# Patient Record
Sex: FEMALE | Race: BLACK OR AFRICAN AMERICAN | NOT HISPANIC OR LATINO | Employment: UNEMPLOYED | RURAL
[De-identification: names, ages, dates, MRNs, and addresses within clinical notes are randomized per-mention and may not be internally consistent; named-entity substitution may affect disease eponyms.]

---

## 2018-10-22 ENCOUNTER — HISTORICAL (OUTPATIENT)
Dept: ADMINISTRATIVE | Facility: HOSPITAL | Age: 28
End: 2018-10-22

## 2018-10-25 LAB
LAB AP CLINICAL INFORMATION: NORMAL
LAB AP GENERAL CAT - HISTORICAL: NORMAL
LAB AP INTERPRETATION/RESULT - HISTORICAL: NORMAL
LAB AP SPECIMEN ADEQUACY - HISTORICAL: NORMAL
LAB AP SPECIMEN SUBMITTED - HISTORICAL: NORMAL

## 2020-09-11 ENCOUNTER — HISTORICAL (OUTPATIENT)
Dept: ADMINISTRATIVE | Facility: HOSPITAL | Age: 30
End: 2020-09-11

## 2021-03-04 ENCOUNTER — HISTORICAL (OUTPATIENT)
Dept: ADMINISTRATIVE | Facility: HOSPITAL | Age: 31
End: 2021-03-04

## 2021-03-29 ENCOUNTER — TELEPHONE (OUTPATIENT)
Dept: OBSTETRICS AND GYNECOLOGY | Facility: CLINIC | Age: 31
End: 2021-03-29

## 2021-03-31 ENCOUNTER — TELEPHONE (OUTPATIENT)
Dept: OBSTETRICS AND GYNECOLOGY | Facility: CLINIC | Age: 31
End: 2021-03-31

## 2021-04-16 ENCOUNTER — TELEPHONE (OUTPATIENT)
Dept: OBSTETRICS AND GYNECOLOGY | Facility: CLINIC | Age: 31
End: 2021-04-16

## 2021-04-16 ENCOUNTER — HOSPITAL ENCOUNTER (OUTPATIENT)
Dept: RADIOLOGY | Facility: HOSPITAL | Age: 31
Discharge: HOME | End: 2021-04-16
Attending: MIDWIFE
Payer: MEDICAID

## 2021-04-16 DIAGNOSIS — R10.2 VAGINAL PAIN: ICD-10-CM

## 2021-04-16 PROCEDURE — 76856 US EXAM PELVIC COMPLETE: CPT | Mod: TC

## 2021-04-16 PROCEDURE — 76856 US EXAM PELVIC COMPLETE: CPT | Mod: 26,,,

## 2021-04-16 PROCEDURE — 76856 US PELVIS COMPLETE NON OB: ICD-10-PCS | Mod: 26,,,

## 2021-05-12 DIAGNOSIS — Z30.45 ENCOUNTER FOR SURVEILLANCE OF TRANSDERMAL CONTRACEPTIVE: Primary | ICD-10-CM

## 2021-05-12 RX ORDER — NORELGESTROMIN AND ETHINYL ESTRADIOL 150; 35 UG/D; UG/D
1 PATCH TRANSDERMAL WEEKLY
Qty: 3 PATCH | Refills: 10 | OUTPATIENT
Start: 2021-05-12

## 2021-05-12 RX ORDER — NORELGESTROMIN AND ETHINYL ESTRADIOL 150; 35 UG/D; UG/D
1 PATCH TRANSDERMAL WEEKLY
Qty: 4 PATCH | Refills: 3 | Status: SHIPPED | OUTPATIENT
Start: 2021-05-12 | End: 2021-05-13 | Stop reason: SDUPTHER

## 2021-05-12 RX ORDER — NORELGESTROMIN AND ETHINYL ESTRADIOL 150; 35 UG/D; UG/D
1 PATCH TRANSDERMAL WEEKLY
COMMUNITY
End: 2021-05-12 | Stop reason: ALTCHOICE

## 2021-05-13 DIAGNOSIS — Z30.45 ENCOUNTER FOR SURVEILLANCE OF TRANSDERMAL CONTRACEPTIVE: ICD-10-CM

## 2021-05-13 RX ORDER — NORELGESTROMIN AND ETHINYL ESTRADIOL 150; 35 UG/D; UG/D
1 PATCH TRANSDERMAL WEEKLY
Qty: 4 PATCH | Refills: 3 | Status: SHIPPED | OUTPATIENT
Start: 2021-05-13 | End: 2021-09-15

## 2021-05-17 ENCOUNTER — TELEPHONE (OUTPATIENT)
Dept: OBSTETRICS AND GYNECOLOGY | Facility: CLINIC | Age: 31
End: 2021-05-17

## 2021-11-03 ENCOUNTER — HOSPITAL ENCOUNTER (EMERGENCY)
Facility: HOSPITAL | Age: 31
Discharge: HOME | End: 2021-11-03
Payer: COMMERCIAL

## 2021-11-03 VITALS
HEIGHT: 64 IN | HEART RATE: 80 BPM | SYSTOLIC BLOOD PRESSURE: 109 MMHG | BODY MASS INDEX: 25.61 KG/M2 | OXYGEN SATURATION: 99 % | WEIGHT: 150 LBS | TEMPERATURE: 98 F | RESPIRATION RATE: 17 BRPM | DIASTOLIC BLOOD PRESSURE: 71 MMHG

## 2021-11-03 DIAGNOSIS — R07.9 CHEST PAIN: ICD-10-CM

## 2021-11-03 DIAGNOSIS — R07.89 ACUTE CHEST WALL PAIN: Primary | ICD-10-CM

## 2021-11-03 LAB
ALBUMIN SERPL BCP-MCNC: 3.6 G/DL (ref 3.5–5)
ALBUMIN/GLOB SERPL: 0.8 {RATIO}
ALP SERPL-CCNC: 85 U/L (ref 37–98)
ALT SERPL W P-5'-P-CCNC: 15 U/L (ref 13–56)
ANION GAP SERPL CALCULATED.3IONS-SCNC: 13 MMOL/L (ref 7–16)
AST SERPL W P-5'-P-CCNC: 16 U/L (ref 15–37)
BACTERIA #/AREA URNS HPF: ABNORMAL /HPF
BASOPHILS # BLD AUTO: 0.04 K/UL (ref 0–0.2)
BASOPHILS NFR BLD AUTO: 0.6 % (ref 0–1)
BILIRUB SERPL-MCNC: 0.2 MG/DL (ref 0–1.2)
BILIRUB UR QL STRIP: NEGATIVE
BUN SERPL-MCNC: 5 MG/DL (ref 7–18)
BUN/CREAT SERPL: 7 (ref 6–20)
CALCIUM SERPL-MCNC: 9.2 MG/DL (ref 8.5–10.1)
CHLORIDE SERPL-SCNC: 104 MMOL/L (ref 98–107)
CLARITY UR: CLEAR
CO2 SERPL-SCNC: 24 MMOL/L (ref 21–32)
COLOR UR: YELLOW
CREAT SERPL-MCNC: 0.67 MG/DL (ref 0.55–1.02)
DIFFERENTIAL METHOD BLD: ABNORMAL
EOSINOPHIL # BLD AUTO: 0.08 K/UL (ref 0–0.5)
EOSINOPHIL NFR BLD AUTO: 1.1 % (ref 1–4)
ERYTHROCYTE [DISTWIDTH] IN BLOOD BY AUTOMATED COUNT: 15 % (ref 11.5–14.5)
GLOBULIN SER-MCNC: 4.8 G/DL (ref 2–4)
GLUCOSE SERPL-MCNC: 97 MG/DL (ref 74–106)
GLUCOSE UR STRIP-MCNC: NEGATIVE MG/DL
HCG UR QL IA.RAPID: NEGATIVE
HCT VFR BLD AUTO: 34.6 % (ref 38–47)
HGB BLD-MCNC: 12.2 G/DL (ref 12–16)
KETONES UR STRIP-SCNC: NEGATIVE MG/DL
LEUKOCYTE ESTERASE UR QL STRIP: ABNORMAL
LYMPHOCYTES # BLD AUTO: 1.96 K/UL (ref 1–4.8)
LYMPHOCYTES NFR BLD AUTO: 27.9 % (ref 27–41)
MAGNESIUM SERPL-MCNC: 1.9 MG/DL (ref 1.7–2.3)
MCH RBC QN AUTO: 26 PG (ref 27–31)
MCHC RBC AUTO-ENTMCNC: 35.3 G/DL (ref 32–36)
MCV RBC AUTO: 73.6 FL (ref 80–96)
MONOCYTES # BLD AUTO: 0.79 K/UL (ref 0–0.8)
MONOCYTES NFR BLD AUTO: 11.3 % (ref 2–6)
MPC BLD CALC-MCNC: 12.6 FL (ref 9.4–12.4)
NEUTROPHILS # BLD AUTO: 4.15 K/UL (ref 1.8–7.7)
NEUTROPHILS NFR BLD AUTO: 59.1 % (ref 53–65)
NITRITE UR QL STRIP: NEGATIVE
PH UR STRIP: 8 PH UNITS
PLATELET # BLD AUTO: 267 K/UL (ref 150–400)
POTASSIUM SERPL-SCNC: 3.4 MMOL/L (ref 3.5–5.1)
PROT SERPL-MCNC: 8.4 G/DL (ref 6.4–8.2)
PROT UR QL STRIP: NEGATIVE
RBC # BLD AUTO: 4.7 M/UL (ref 4.2–5.4)
RBC # UR STRIP: NEGATIVE /UL
RBC #/AREA URNS HPF: ABNORMAL /HPF
SODIUM SERPL-SCNC: 138 MMOL/L (ref 136–145)
SP GR UR STRIP: 1.01
SQUAMOUS #/AREA URNS LPF: ABNORMAL /LPF
TROPONIN I SERPL HS-MCNC: 4.3 PG/ML
UROBILINOGEN UR STRIP-ACNC: 0.2 MG/DL
WBC # BLD AUTO: 7.02 K/UL (ref 4.5–11)
WBC #/AREA URNS HPF: ABNORMAL /HPF

## 2021-11-03 PROCEDURE — 36415 COLL VENOUS BLD VENIPUNCTURE: CPT | Performed by: NURSE PRACTITIONER

## 2021-11-03 PROCEDURE — 99283 PR EMERGENCY DEPT VISIT,LEVEL III: ICD-10-PCS | Mod: ,,, | Performed by: NURSE PRACTITIONER

## 2021-11-03 PROCEDURE — 81025 URINE PREGNANCY TEST: CPT | Performed by: NURSE PRACTITIONER

## 2021-11-03 PROCEDURE — 81001 URINALYSIS AUTO W/SCOPE: CPT | Performed by: NURSE PRACTITIONER

## 2021-11-03 PROCEDURE — 84484 ASSAY OF TROPONIN QUANT: CPT | Performed by: NURSE PRACTITIONER

## 2021-11-03 PROCEDURE — 81003 URINALYSIS AUTO W/O SCOPE: CPT | Performed by: NURSE PRACTITIONER

## 2021-11-03 PROCEDURE — 80053 COMPREHEN METABOLIC PANEL: CPT | Performed by: NURSE PRACTITIONER

## 2021-11-03 PROCEDURE — 93005 ELECTROCARDIOGRAM TRACING: CPT

## 2021-11-03 PROCEDURE — 93010 EKG 12-LEAD: ICD-10-PCS | Mod: ,,, | Performed by: INTERNAL MEDICINE

## 2021-11-03 PROCEDURE — 63600175 PHARM REV CODE 636 W HCPCS: Performed by: NURSE PRACTITIONER

## 2021-11-03 PROCEDURE — 93010 ELECTROCARDIOGRAM REPORT: CPT | Mod: ,,, | Performed by: INTERNAL MEDICINE

## 2021-11-03 PROCEDURE — 99283 EMERGENCY DEPT VISIT LOW MDM: CPT | Mod: ,,, | Performed by: NURSE PRACTITIONER

## 2021-11-03 PROCEDURE — 84075 ASSAY ALKALINE PHOSPHATASE: CPT | Performed by: NURSE PRACTITIONER

## 2021-11-03 PROCEDURE — 83735 ASSAY OF MAGNESIUM: CPT | Performed by: NURSE PRACTITIONER

## 2021-11-03 PROCEDURE — 99285 EMERGENCY DEPT VISIT HI MDM: CPT | Mod: 25

## 2021-11-03 PROCEDURE — 96372 THER/PROPH/DIAG INJ SC/IM: CPT

## 2021-11-03 PROCEDURE — 85025 COMPLETE CBC W/AUTO DIFF WBC: CPT | Performed by: NURSE PRACTITIONER

## 2021-11-03 RX ORDER — KETOROLAC TROMETHAMINE 30 MG/ML
30 INJECTION, SOLUTION INTRAMUSCULAR; INTRAVENOUS
Status: COMPLETED | OUTPATIENT
Start: 2021-11-03 | End: 2021-11-03

## 2021-11-03 RX ADMIN — KETOROLAC TROMETHAMINE 30 MG: 30 INJECTION, SOLUTION INTRAMUSCULAR at 04:11

## 2021-11-05 ENCOUNTER — TELEPHONE (OUTPATIENT)
Dept: EMERGENCY MEDICINE | Facility: HOSPITAL | Age: 31
End: 2021-11-05
Payer: MEDICAID

## 2021-11-10 ENCOUNTER — OFFICE VISIT (OUTPATIENT)
Dept: OBSTETRICS AND GYNECOLOGY | Facility: CLINIC | Age: 31
End: 2021-11-10
Payer: COMMERCIAL

## 2021-11-10 VITALS
WEIGHT: 144.19 LBS | HEART RATE: 97 BPM | DIASTOLIC BLOOD PRESSURE: 77 MMHG | SYSTOLIC BLOOD PRESSURE: 130 MMHG | BODY MASS INDEX: 24.62 KG/M2 | OXYGEN SATURATION: 99 % | RESPIRATION RATE: 16 BRPM | HEIGHT: 64 IN

## 2021-11-10 DIAGNOSIS — R00.0 TACHYCARDIA: ICD-10-CM

## 2021-11-10 DIAGNOSIS — Z72.51 HIGH RISK HETEROSEXUAL BEHAVIOR: Primary | ICD-10-CM

## 2021-11-10 DIAGNOSIS — N89.8 VAGINAL ODOR: ICD-10-CM

## 2021-11-10 LAB
CANDIDA SPECIES: NEGATIVE
GARDNERELLA: POSITIVE
T4 FREE SERPL-MCNC: 0.96 NG/DL (ref 0.76–1.46)
TRICHOMONAS: NEGATIVE
TSH SERPL DL<=0.005 MIU/L-ACNC: 0.4 UIU/ML (ref 0.36–3.74)

## 2021-11-10 PROCEDURE — 87510 BACTERIAL VAGINOSIS: ICD-10-PCS | Mod: ,,, | Performed by: CLINICAL MEDICAL LABORATORY

## 2021-11-10 PROCEDURE — 87591 N.GONORRHOEAE DNA AMP PROB: CPT | Mod: ,,, | Performed by: CLINICAL MEDICAL LABORATORY

## 2021-11-10 PROCEDURE — 84439 T4, FREE: ICD-10-PCS | Mod: ,,, | Performed by: CLINICAL MEDICAL LABORATORY

## 2021-11-10 PROCEDURE — 99213 OFFICE O/P EST LOW 20 MIN: CPT | Mod: ,,, | Performed by: ADVANCED PRACTICE MIDWIFE

## 2021-11-10 PROCEDURE — 99213 PR OFFICE/OUTPT VISIT, EST, LEVL III, 20-29 MIN: ICD-10-PCS | Mod: ,,, | Performed by: ADVANCED PRACTICE MIDWIFE

## 2021-11-10 PROCEDURE — 87480 BACTERIAL VAGINOSIS: ICD-10-PCS | Mod: ,,, | Performed by: CLINICAL MEDICAL LABORATORY

## 2021-11-10 PROCEDURE — 87591 CHLAMYDIA/GONORRHOEAE(GC), PCR: ICD-10-PCS | Mod: ,,, | Performed by: CLINICAL MEDICAL LABORATORY

## 2021-11-10 PROCEDURE — 87491 CHLAMYDIA/GONORRHOEAE(GC), PCR: ICD-10-PCS | Mod: ,,, | Performed by: CLINICAL MEDICAL LABORATORY

## 2021-11-10 PROCEDURE — 84443 ASSAY THYROID STIM HORMONE: CPT | Mod: ,,, | Performed by: CLINICAL MEDICAL LABORATORY

## 2021-11-10 PROCEDURE — 84439 ASSAY OF FREE THYROXINE: CPT | Mod: ,,, | Performed by: CLINICAL MEDICAL LABORATORY

## 2021-11-10 PROCEDURE — 87660 TRICHOMONAS VAGIN DIR PROBE: CPT | Mod: ,,, | Performed by: CLINICAL MEDICAL LABORATORY

## 2021-11-10 PROCEDURE — 84443 TSH: ICD-10-PCS | Mod: ,,, | Performed by: CLINICAL MEDICAL LABORATORY

## 2021-11-10 PROCEDURE — 87510 GARDNER VAG DNA DIR PROBE: CPT | Mod: ,,, | Performed by: CLINICAL MEDICAL LABORATORY

## 2021-11-10 PROCEDURE — 87480 CANDIDA DNA DIR PROBE: CPT | Mod: ,,, | Performed by: CLINICAL MEDICAL LABORATORY

## 2021-11-10 PROCEDURE — 87660 BACTERIAL VAGINOSIS: ICD-10-PCS | Mod: ,,, | Performed by: CLINICAL MEDICAL LABORATORY

## 2021-11-10 PROCEDURE — 87491 CHLMYD TRACH DNA AMP PROBE: CPT | Mod: ,,, | Performed by: CLINICAL MEDICAL LABORATORY

## 2021-11-10 RX ORDER — CHLORPHENIRAMINE MALEATE, DEXTROMETHORPHAN HYDROBROMIDE, AND PHENYLEPHRINE HYDROCHLORIDE 4; 10; 10 MG/1; MG/1; MG/1
TABLET, COATED ORAL
COMMUNITY
Start: 2021-11-09 | End: 2022-01-31 | Stop reason: ALTCHOICE

## 2021-11-10 RX ORDER — METHYLPREDNISOLONE 4 MG/1
TABLET ORAL
COMMUNITY
Start: 2021-11-09 | End: 2022-01-31 | Stop reason: ALTCHOICE

## 2021-11-11 DIAGNOSIS — N76.0 BACTERIAL VAGINOSIS: Primary | ICD-10-CM

## 2021-11-11 DIAGNOSIS — B96.89 BACTERIAL VAGINOSIS: Primary | ICD-10-CM

## 2021-11-11 LAB
CHLAMYDIA BY PCR: NEGATIVE
N. GONORRHOEAE (GC) BY PCR: NEGATIVE

## 2021-11-11 RX ORDER — METRONIDAZOLE 500 MG/1
500 TABLET ORAL 2 TIMES DAILY
Qty: 14 TABLET | Refills: 0 | Status: SHIPPED | OUTPATIENT
Start: 2021-11-11 | End: 2021-11-18

## 2021-11-12 ENCOUNTER — TELEPHONE (OUTPATIENT)
Dept: OBSTETRICS AND GYNECOLOGY | Facility: CLINIC | Age: 31
End: 2021-11-12
Payer: MEDICAID

## 2022-01-31 ENCOUNTER — OFFICE VISIT (OUTPATIENT)
Dept: OBSTETRICS AND GYNECOLOGY | Facility: CLINIC | Age: 32
End: 2022-01-31
Payer: COMMERCIAL

## 2022-01-31 VITALS
DIASTOLIC BLOOD PRESSURE: 82 MMHG | OXYGEN SATURATION: 99 % | WEIGHT: 147 LBS | HEIGHT: 64 IN | HEART RATE: 83 BPM | SYSTOLIC BLOOD PRESSURE: 124 MMHG | BODY MASS INDEX: 25.1 KG/M2 | RESPIRATION RATE: 17 BRPM | TEMPERATURE: 98 F

## 2022-01-31 DIAGNOSIS — N89.8 VAGINAL DISCHARGE: ICD-10-CM

## 2022-01-31 DIAGNOSIS — N92.0 MENORRHAGIA WITH REGULAR CYCLE: ICD-10-CM

## 2022-01-31 DIAGNOSIS — N92.1 BREAKTHROUGH BLEEDING ON CONTRACEPTIVE PATCH: ICD-10-CM

## 2022-01-31 DIAGNOSIS — Z30.45 ENCOUNTER FOR SURVEILLANCE OF TRANSDERMAL PATCH HORMONAL CONTRACEPTIVE DEVICE: Primary | ICD-10-CM

## 2022-01-31 DIAGNOSIS — R23.2 HOT FLASHES: ICD-10-CM

## 2022-01-31 LAB
CANDIDA SPECIES: NEGATIVE
FSH SERPL-ACNC: 0.4 MIU/ML (ref 1.7–134.8)
GARDNERELLA: POSITIVE
TRICHOMONAS: NEGATIVE

## 2022-01-31 PROCEDURE — 1159F PR MEDICATION LIST DOCUMENTED IN MEDICAL RECORD: ICD-10-PCS | Mod: CPTII,,, | Performed by: ADVANCED PRACTICE MIDWIFE

## 2022-01-31 PROCEDURE — 83001 ASSAY OF GONADOTROPIN (FSH): CPT | Mod: ,,, | Performed by: CLINICAL MEDICAL LABORATORY

## 2022-01-31 PROCEDURE — 3008F BODY MASS INDEX DOCD: CPT | Mod: CPTII,,, | Performed by: ADVANCED PRACTICE MIDWIFE

## 2022-01-31 PROCEDURE — 87480 CANDIDA DNA DIR PROBE: CPT | Mod: ,,, | Performed by: CLINICAL MEDICAL LABORATORY

## 2022-01-31 PROCEDURE — 3074F SYST BP LT 130 MM HG: CPT | Mod: CPTII,,, | Performed by: ADVANCED PRACTICE MIDWIFE

## 2022-01-31 PROCEDURE — 3079F PR MOST RECENT DIASTOLIC BLOOD PRESSURE 80-89 MM HG: ICD-10-PCS | Mod: CPTII,,, | Performed by: ADVANCED PRACTICE MIDWIFE

## 2022-01-31 PROCEDURE — 3074F PR MOST RECENT SYSTOLIC BLOOD PRESSURE < 130 MM HG: ICD-10-PCS | Mod: CPTII,,, | Performed by: ADVANCED PRACTICE MIDWIFE

## 2022-01-31 PROCEDURE — 87660 TRICHOMONAS VAGIN DIR PROBE: CPT | Mod: ,,, | Performed by: CLINICAL MEDICAL LABORATORY

## 2022-01-31 PROCEDURE — 1159F MED LIST DOCD IN RCRD: CPT | Mod: CPTII,,, | Performed by: ADVANCED PRACTICE MIDWIFE

## 2022-01-31 PROCEDURE — 99213 OFFICE O/P EST LOW 20 MIN: CPT | Mod: ,,, | Performed by: ADVANCED PRACTICE MIDWIFE

## 2022-01-31 PROCEDURE — 3079F DIAST BP 80-89 MM HG: CPT | Mod: CPTII,,, | Performed by: ADVANCED PRACTICE MIDWIFE

## 2022-01-31 PROCEDURE — 87660 BACTERIAL VAGINOSIS: ICD-10-PCS | Mod: ,,, | Performed by: CLINICAL MEDICAL LABORATORY

## 2022-01-31 PROCEDURE — 3008F PR BODY MASS INDEX (BMI) DOCUMENTED: ICD-10-PCS | Mod: CPTII,,, | Performed by: ADVANCED PRACTICE MIDWIFE

## 2022-01-31 PROCEDURE — 87510 GARDNER VAG DNA DIR PROBE: CPT | Mod: ,,, | Performed by: CLINICAL MEDICAL LABORATORY

## 2022-01-31 PROCEDURE — 99213 PR OFFICE/OUTPT VISIT, EST, LEVL III, 20-29 MIN: ICD-10-PCS | Mod: ,,, | Performed by: ADVANCED PRACTICE MIDWIFE

## 2022-01-31 PROCEDURE — 83001 FOLLICLE STIMULATING HORMONE: ICD-10-PCS | Mod: ,,, | Performed by: CLINICAL MEDICAL LABORATORY

## 2022-01-31 PROCEDURE — 87480 BACTERIAL VAGINOSIS: ICD-10-PCS | Mod: ,,, | Performed by: CLINICAL MEDICAL LABORATORY

## 2022-01-31 PROCEDURE — 87510 BACTERIAL VAGINOSIS: ICD-10-PCS | Mod: ,,, | Performed by: CLINICAL MEDICAL LABORATORY

## 2022-01-31 NOTE — LETTER
January 31, 2022      [facility] - Obstetrics and Gynecology  [address] [place] [id number]  Phone: [phone]  Fax: [phone]       Patient: Glenys Modi   YOB: 1990  Date of Visit: 01/31/2022    To Whom It May Concern:    Ozzy Modi  was at [facility] on 01/31/2022. The patient may return to work/school on 2/1/22 with no restrictions. If you have any questions or concerns, or if I can be of further assistance, please do not hesitate to contact me.    Sincerely,    Janice Barnard LPN

## 2022-01-31 NOTE — PROGRESS NOTES
Subjective:       Patient ID: Glenys Modi is a 31 y.o. female.    Chief Complaint: Vaginal Bleeding (X Jan. 10th -21st stopped x 2 or 3 days  and started again. No pain)    HPI  Glenys is using Xulane patches for her BC.  Last month she had a prolonged period from 1/10-1/21/22.  She had more BTB last week.  She is not bleeding today.   She is sexually active.   She is concerned about the bleeding.  I offered her the option of changing BC to Depo or ocps or placing her lst patch on day 4 of cycle instead of Day 7. She wants to try putting the first patch on Day 4.  She is on the 3rd patch now and should have another period next week.  Review of Systems   Constitutional: Negative.    HENT: Negative.    Respiratory: Negative.    Cardiovascular: Negative.    Gastrointestinal: Negative.    Genitourinary: Positive for menstrual problem.   Neurological: Negative.    Hematological: Negative.    Psychiatric/Behavioral: Negative.          Objective:      Physical Exam  Vitals and nursing note reviewed.   Constitutional:       Appearance: She is normal weight.   HENT:      Head: Normocephalic.      Nose: Nose normal.   Eyes:      Extraocular Movements: Extraocular movements intact.   Cardiovascular:      Rate and Rhythm: Normal rate.   Pulmonary:      Effort: Pulmonary effort is normal.   Abdominal:      General: Abdomen is flat. There is no distension.      Palpations: Abdomen is soft. There is no mass.      Tenderness: There is no abdominal tenderness.   Skin:     General: Skin is warm and dry.   Neurological:      Mental Status: She is alert and oriented to person, place, and time.   Psychiatric:         Mood and Affect: Mood normal.         Behavior: Behavior normal.         Assessment:       Problem List Items Addressed This Visit    None     Visit Diagnoses     Encounter for surveillance of transdermal patch hormonal contraceptive device    -  Primary    Menorrhagia with regular cycle        Relevant Orders    CBC Auto  Differential    Hot flashes        Relevant Orders    Follicle Stimulating Hormone    Vaginal discharge        Relevant Orders    Bacterial Vaginosis    Breakthrough bleeding on contraceptive patch              Plan:     Start first patch of cycle on Day 4 of bleeding    CBC    F/u 2 month and prn.

## 2022-02-01 ENCOUNTER — TELEPHONE (OUTPATIENT)
Dept: OBSTETRICS AND GYNECOLOGY | Facility: CLINIC | Age: 32
End: 2022-02-01
Payer: MEDICAID

## 2022-02-01 DIAGNOSIS — N76.0 BACTERIAL VAGINOSIS: Primary | ICD-10-CM

## 2022-02-01 DIAGNOSIS — B96.89 BACTERIAL VAGINOSIS: Primary | ICD-10-CM

## 2022-02-01 RX ORDER — METRONIDAZOLE 500 MG/1
500 TABLET ORAL 2 TIMES DAILY
Qty: 14 TABLET | Refills: 0 | Status: SHIPPED | OUTPATIENT
Start: 2022-02-01 | End: 2022-02-08

## 2022-02-01 NOTE — TELEPHONE ENCOUNTER
----- Message from Liya Benitez CNM sent at 2/1/2022  8:27 AM CST -----  Review with pt.as BV and I sent Flagyl.  Her hormone level was normal, so I don't know why she is having the hot flashes.

## 2022-02-07 ENCOUNTER — TELEPHONE (OUTPATIENT)
Dept: OBSTETRICS AND GYNECOLOGY | Facility: CLINIC | Age: 32
End: 2022-02-07
Payer: MEDICAID

## 2022-09-09 DIAGNOSIS — O44.40 LOW-LYING PLACENTA: Primary | ICD-10-CM

## 2022-09-09 DIAGNOSIS — Z34.90 PREGNANCY, UNSPECIFIED GESTATIONAL AGE: ICD-10-CM

## 2022-09-21 ENCOUNTER — HOSPITAL ENCOUNTER (OUTPATIENT)
Dept: RADIOLOGY | Facility: HOSPITAL | Age: 32
Discharge: HOME | End: 2022-09-21
Attending: ADVANCED PRACTICE MIDWIFE
Payer: MEDICAID

## 2022-09-21 ENCOUNTER — ROUTINE PRENATAL (OUTPATIENT)
Dept: OBSTETRICS AND GYNECOLOGY | Facility: CLINIC | Age: 32
End: 2022-09-21
Payer: MEDICAID

## 2022-09-21 VITALS — DIASTOLIC BLOOD PRESSURE: 64 MMHG | BODY MASS INDEX: 25.85 KG/M2 | WEIGHT: 150.63 LBS | SYSTOLIC BLOOD PRESSURE: 92 MMHG

## 2022-09-21 DIAGNOSIS — Z34.83 ENCOUNTER FOR SUPERVISION OF OTHER NORMAL PREGNANCY IN THIRD TRIMESTER: Primary | ICD-10-CM

## 2022-09-21 DIAGNOSIS — Z3A.27 27 WEEKS GESTATION OF PREGNANCY: ICD-10-CM

## 2022-09-21 DIAGNOSIS — O44.40 LOW-LYING PLACENTA: ICD-10-CM

## 2022-09-21 LAB
BILIRUB SERPL-MCNC: NORMAL MG/DL
BLOOD URINE, POC: NORMAL
COLOR, POC UA: NORMAL
GLUCOSE UR QL STRIP: 100
KETONES UR QL STRIP: NORMAL
LEUKOCYTE ESTERASE URINE, POC: NORMAL
NITRITE, POC UA: NORMAL
PH, POC UA: 6.5
PROTEIN, POC: NORMAL
SPECIFIC GRAVITY, POC UA: 1
UROBILINOGEN, POC UA: NORMAL

## 2022-09-21 PROCEDURE — 76816 OB US FOLLOW-UP PER FETUS: CPT | Mod: 26,,, | Performed by: RADIOLOGY

## 2022-09-21 PROCEDURE — 81003 URINALYSIS AUTO W/O SCOPE: CPT | Mod: PBBFAC | Performed by: ADVANCED PRACTICE MIDWIFE

## 2022-09-21 PROCEDURE — 76816 OB US FOLLOW-UP PER FETUS: CPT | Mod: TC

## 2022-09-21 PROCEDURE — 99213 PR OFFICE/OUTPT VISIT, EST, LEVL III, 20-29 MIN: ICD-10-PCS | Mod: S$PBB,TH,, | Performed by: ADVANCED PRACTICE MIDWIFE

## 2022-09-21 PROCEDURE — 99213 OFFICE O/P EST LOW 20 MIN: CPT | Mod: S$PBB,TH,, | Performed by: ADVANCED PRACTICE MIDWIFE

## 2022-09-21 PROCEDURE — 99213 OFFICE O/P EST LOW 20 MIN: CPT | Mod: PBBFAC | Performed by: ADVANCED PRACTICE MIDWIFE

## 2022-09-21 PROCEDURE — 76816 US OB FOLLOW UP EA GESTATION TRANSABDOMINAL: ICD-10-PCS | Mod: 26,,, | Performed by: RADIOLOGY

## 2022-09-21 RX ORDER — ONDANSETRON 8 MG/1
TABLET, ORALLY DISINTEGRATING ORAL
Status: ON HOLD | COMMUNITY
Start: 2022-06-29 | End: 2022-12-02 | Stop reason: HOSPADM

## 2022-09-21 RX ORDER — PYRIDOXINE HCL (VITAMIN B6) 100 MG
1 TABLET ORAL
Status: ON HOLD | COMMUNITY
End: 2022-12-02 | Stop reason: HOSPADM

## 2022-09-21 RX ORDER — OMEPRAZOLE 20 MG/1
40 TABLET, DELAYED RELEASE ORAL DAILY
Qty: 28 TABLET | Refills: 5 | Status: SHIPPED | OUTPATIENT
Start: 2022-09-21 | End: 2023-02-05

## 2022-09-21 RX ORDER — FAMOTIDINE 20 MG/1
1 TABLET, FILM COATED ORAL
COMMUNITY
Start: 2022-01-10 | End: 2022-09-21 | Stop reason: ALTCHOICE

## 2022-09-21 RX ORDER — NIFEDIPINE 10 MG/1
CAPSULE ORAL
Status: ON HOLD | COMMUNITY
Start: 2022-08-18 | End: 2022-12-02 | Stop reason: HOSPADM

## 2022-09-21 NOTE — PROGRESS NOTES
Pepcid not helping indigestion.  Rx Prilosec escripted.  1hr GTT done today.   labor precautions reviewed.  Discussed danger s/s to report including decreased fetal movements.

## 2022-09-23 ENCOUNTER — HOSPITAL ENCOUNTER (OUTPATIENT)
Facility: HOSPITAL | Age: 32
Discharge: HOME | End: 2022-09-23
Attending: OBSTETRICS & GYNECOLOGY | Admitting: OBSTETRICS & GYNECOLOGY
Payer: MEDICAID

## 2022-09-23 VITALS — HEART RATE: 84 BPM | DIASTOLIC BLOOD PRESSURE: 76 MMHG | SYSTOLIC BLOOD PRESSURE: 121 MMHG

## 2022-09-23 DIAGNOSIS — O46.90 VAGINAL BLEEDING IN PREGNANCY: ICD-10-CM

## 2022-09-23 LAB
BILIRUB UR QL STRIP: NEGATIVE
CLARITY UR: CLEAR
COLOR UR: COLORLESS
GLUCOSE UR STRIP-MCNC: NORMAL MG/DL
KETONES UR STRIP-SCNC: NEGATIVE MG/DL
LEUKOCYTE ESTERASE UR QL STRIP: NEGATIVE
NITRITE UR QL STRIP: NEGATIVE
PH UR STRIP: 6 PH UNITS
PROT UR QL STRIP: NEGATIVE
RBC # UR STRIP: NEGATIVE /UL
SP GR UR STRIP: 1.01
UROBILINOGEN UR STRIP-ACNC: NORMAL MG/DL

## 2022-09-23 PROCEDURE — 99211 OFF/OP EST MAY X REQ PHY/QHP: CPT

## 2022-09-23 PROCEDURE — 81003 URINALYSIS AUTO W/O SCOPE: CPT | Performed by: OBSTETRICS & GYNECOLOGY

## 2022-09-23 RX ORDER — ACETAMINOPHEN 500 MG
500 TABLET ORAL EVERY 6 HOURS PRN
Status: DISCONTINUED | OUTPATIENT
Start: 2022-09-23 | End: 2022-09-24 | Stop reason: HOSPADM

## 2022-09-23 RX ORDER — PROCHLORPERAZINE EDISYLATE 5 MG/ML
5 INJECTION INTRAMUSCULAR; INTRAVENOUS EVERY 6 HOURS PRN
Status: DISCONTINUED | OUTPATIENT
Start: 2022-09-23 | End: 2022-09-24 | Stop reason: HOSPADM

## 2022-09-23 RX ORDER — ONDANSETRON 4 MG/1
8 TABLET, ORALLY DISINTEGRATING ORAL EVERY 8 HOURS PRN
Status: DISCONTINUED | OUTPATIENT
Start: 2022-09-23 | End: 2022-09-24 | Stop reason: HOSPADM

## 2022-09-24 NOTE — H&P
Chief Complain - pelvic pain; hemorrhoidal pain    HPI:  [age] year old G 4 P 3003 at 28 and 1/7 WGA presents with complaint of pelvic and hemorrhoidal pain.    ROS:  + fetal movement; no leakage of fluid; no vaginal bleeding; no contractions  All other review of systems negative      Past medical history-none    Past surgical history-none    Past OB history- x3 all at term    Past gyn history-no recent STDs or abnormal Paps    Medications-prenatal vitamins    Social history-no alcohol, tobacco, or drugs    Allergies-no known drug allergies    Family history-mother with type 2 diabetes        Vital signs are stable and the patient is afebrile  Fetal heart tones -130s and reactive/category 1; tocometer - no contractions        Physical exam:    General-alert and oriented x3 no acute distress  Chest-clear to auscultation bilaterally  Heart-regular rate and rhythm  HEENT-PERRL; EOMI  Abdomen-soft, nontender, gravid uterus with no fundal tenderness  -no vulvar vaginal lesions; cervix-long/thick/closed by SSE; no vaginal bleeding or leaking of fluid  Extremities-no lower extremity edema  Rectal-mildly inflamed external hemorrhoids present    Laboratories:    Urinalysis negative    Radiology:    None    Assessment and plan)  1-intrauterine pregnancy 28 and 1/7 weeks gestational age  2-pelvic pain with no signs or symptoms of  labor  3-inflamed hemorrhoids-Rx for Proctofoam NS given; patient also given General Care instructions for further hemorrhoidal care  4-will discharge home; patient follow-up with OB as scheduled return to a observation for worsening symptoms or any other problems

## 2022-10-06 ENCOUNTER — ROUTINE PRENATAL (OUTPATIENT)
Dept: OBSTETRICS AND GYNECOLOGY | Facility: CLINIC | Age: 32
End: 2022-10-06
Payer: MEDICAID

## 2022-10-06 VITALS — BODY MASS INDEX: 25.75 KG/M2 | WEIGHT: 150 LBS | DIASTOLIC BLOOD PRESSURE: 62 MMHG | SYSTOLIC BLOOD PRESSURE: 90 MMHG

## 2022-10-06 DIAGNOSIS — Z34.83 ENCOUNTER FOR SUPERVISION OF OTHER NORMAL PREGNANCY IN THIRD TRIMESTER: Primary | ICD-10-CM

## 2022-10-06 DIAGNOSIS — K11.7 PTYALISM: ICD-10-CM

## 2022-10-06 DIAGNOSIS — R35.0 URINARY FREQUENCY: ICD-10-CM

## 2022-10-06 DIAGNOSIS — Z3A.30 30 WEEKS GESTATION OF PREGNANCY: ICD-10-CM

## 2022-10-06 LAB
-2 PROPSA (PHI): NORMAL
BILIRUB SERPL-MCNC: NORMAL MG/DL
BLOOD URINE, POC: NORMAL
COLOR, POC UA: COLORLESS
GLUCOSE UR QL STRIP: NORMAL
KETONES UR QL STRIP: NORMAL
LEUKOCYTE ESTERASE URINE, POC: NORMAL
NITRITE, POC UA: NORMAL
PH, POC UA: 5
PROTEIN, POC: NORMAL
SPECIFIC GRAVITY, POC UA: 1
UROBILINOGEN, POC UA: NORMAL

## 2022-10-06 PROCEDURE — 99213 PR OFFICE/OUTPT VISIT, EST, LEVL III, 20-29 MIN: ICD-10-PCS | Mod: S$PBB,TH,, | Performed by: ADVANCED PRACTICE MIDWIFE

## 2022-10-06 PROCEDURE — 99213 OFFICE O/P EST LOW 20 MIN: CPT | Mod: PBBFAC | Performed by: ADVANCED PRACTICE MIDWIFE

## 2022-10-06 PROCEDURE — 99213 OFFICE O/P EST LOW 20 MIN: CPT | Mod: S$PBB,TH,, | Performed by: ADVANCED PRACTICE MIDWIFE

## 2022-10-06 PROCEDURE — 81003 URINALYSIS AUTO W/O SCOPE: CPT | Mod: PBBFAC | Performed by: ADVANCED PRACTICE MIDWIFE

## 2022-10-06 NOTE — PROGRESS NOTES
C/O hemorrhoids.  Using tucks pad and hemorrhoid cream.  Recommend stool softener daily through the remainder of the pregnancy.  labor precautions reviewed.  Discussed danger s/s to report including decreased fetal movements.

## 2022-10-20 ENCOUNTER — ROUTINE PRENATAL (OUTPATIENT)
Dept: OBSTETRICS AND GYNECOLOGY | Facility: CLINIC | Age: 32
End: 2022-10-20
Payer: MEDICAID

## 2022-10-20 ENCOUNTER — PATIENT MESSAGE (OUTPATIENT)
Dept: OBSTETRICS AND GYNECOLOGY | Facility: CLINIC | Age: 32
End: 2022-10-20
Payer: MEDICAID

## 2022-10-20 VITALS — SYSTOLIC BLOOD PRESSURE: 90 MMHG | BODY MASS INDEX: 26.61 KG/M2 | DIASTOLIC BLOOD PRESSURE: 62 MMHG | WEIGHT: 155 LBS

## 2022-10-20 DIAGNOSIS — R35.0 URINARY FREQUENCY: ICD-10-CM

## 2022-10-20 DIAGNOSIS — Z3A.32 32 WEEKS GESTATION OF PREGNANCY: ICD-10-CM

## 2022-10-20 DIAGNOSIS — K11.7 PTYALISM: ICD-10-CM

## 2022-10-20 DIAGNOSIS — Z34.83 ENCOUNTER FOR SUPERVISION OF OTHER NORMAL PREGNANCY IN THIRD TRIMESTER: Primary | ICD-10-CM

## 2022-10-20 LAB
BILIRUB SERPL-MCNC: NORMAL MG/DL
BLOOD URINE, POC: NORMAL
COLOR, POC UA: YELLOW
GLUCOSE UR QL STRIP: NORMAL
KETONES UR QL STRIP: NORMAL
LEUKOCYTE ESTERASE URINE, POC: NORMAL
NITRITE, POC UA: NORMAL
PH, POC UA: 5
PROTEIN, POC: NORMAL
SPECIFIC GRAVITY, POC UA: 1.01
UROBILINOGEN, POC UA: NORMAL

## 2022-10-20 PROCEDURE — 99213 OFFICE O/P EST LOW 20 MIN: CPT | Mod: PBBFAC | Performed by: ADVANCED PRACTICE MIDWIFE

## 2022-10-20 PROCEDURE — 99213 PR OFFICE/OUTPT VISIT, EST, LEVL III, 20-29 MIN: ICD-10-PCS | Mod: S$PBB,TH,, | Performed by: ADVANCED PRACTICE MIDWIFE

## 2022-10-20 PROCEDURE — 81003 URINALYSIS AUTO W/O SCOPE: CPT | Mod: PBBFAC | Performed by: ADVANCED PRACTICE MIDWIFE

## 2022-10-20 PROCEDURE — 99213 OFFICE O/P EST LOW 20 MIN: CPT | Mod: S$PBB,TH,, | Performed by: ADVANCED PRACTICE MIDWIFE

## 2022-10-20 NOTE — PROGRESS NOTES
Denies sig complaints today.   labor precautions reviewed.  Discussed danger s/s to report including decreased fetal movements. Medicaid tubal consent signed.  Discussed R/B/A and pt wishes to proceed.

## 2022-11-01 ENCOUNTER — TELEPHONE (OUTPATIENT)
Dept: OBSTETRICS AND GYNECOLOGY | Facility: CLINIC | Age: 32
End: 2022-11-01
Payer: MEDICAID

## 2022-11-01 RX ORDER — OSELTAMIVIR PHOSPHATE 75 MG/1
75 CAPSULE ORAL 2 TIMES DAILY
Qty: 10 CAPSULE | Refills: 0 | Status: SHIPPED | OUTPATIENT
Start: 2022-11-01 | End: 2022-11-06

## 2022-11-01 NOTE — TELEPHONE ENCOUNTER
----- Message from Pramod Loja sent at 10/31/2022  4:12 PM CDT -----  NEEDS A CALL BACK  [phone]

## 2022-11-01 NOTE — TELEPHONE ENCOUNTER
Pt's son tested positive for flu but she was negative. She was told she would need to call [facility] to get a Rx for tamiflu. She's having some throat irriation, ear ache, headache. Let pt know that I would get w/Mana & see how she wants to proceed. Advised pt to stay hydrated & take tylenol for fever as needed. Pt also stated that she has an appt on Thursday.

## 2022-11-03 ENCOUNTER — ROUTINE PRENATAL (OUTPATIENT)
Dept: OBSTETRICS AND GYNECOLOGY | Facility: CLINIC | Age: 32
End: 2022-11-03
Payer: MEDICAID

## 2022-11-03 VITALS — BODY MASS INDEX: 26.61 KG/M2 | DIASTOLIC BLOOD PRESSURE: 62 MMHG | SYSTOLIC BLOOD PRESSURE: 90 MMHG | WEIGHT: 155 LBS

## 2022-11-03 DIAGNOSIS — Z3A.34 34 WEEKS GESTATION OF PREGNANCY: Primary | ICD-10-CM

## 2022-11-03 PROCEDURE — 59425 ANTEPARTUM CARE ONLY: CPT | Mod: S$PBB,TH,, | Performed by: ADVANCED PRACTICE MIDWIFE

## 2022-11-03 PROCEDURE — 59425 PR ANTEPARTUM CARE ONLY, 4-6 VISITS: ICD-10-PCS | Mod: S$PBB,TH,, | Performed by: ADVANCED PRACTICE MIDWIFE

## 2022-11-03 PROCEDURE — 99213 OFFICE O/P EST LOW 20 MIN: CPT | Mod: PBBFAC | Performed by: ADVANCED PRACTICE MIDWIFE

## 2022-11-03 NOTE — PROGRESS NOTES
C/O multiple contractions throughout the day.  Some hours they are consistent.  C/O lower back pain and pelvic pain.  Has tried heating pad on back and this doesn't help.  Has also tried Tylenol.  Cx 1cm, thick soft posterior today.  Abd palpates soft, non-tender. Pt has not taken her procardia this morning.  Instr to take every 6 hours.  Instr to increase fluids.   labor precautions reviewed.  Discussed danger s/s to report including decreased fetal movements.

## 2022-11-10 ENCOUNTER — PATIENT MESSAGE (OUTPATIENT)
Dept: OBSTETRICS AND GYNECOLOGY | Facility: CLINIC | Age: 32
End: 2022-11-10
Payer: MEDICAID

## 2022-11-11 ENCOUNTER — HOSPITAL ENCOUNTER (OUTPATIENT)
Facility: HOSPITAL | Age: 32
Discharge: HOME | End: 2022-11-11
Attending: OBSTETRICS & GYNECOLOGY | Admitting: OBSTETRICS & GYNECOLOGY
Payer: MEDICAID

## 2022-11-11 VITALS
DIASTOLIC BLOOD PRESSURE: 71 MMHG | SYSTOLIC BLOOD PRESSURE: 129 MMHG | TEMPERATURE: 98 F | RESPIRATION RATE: 18 BRPM | OXYGEN SATURATION: 99 % | HEART RATE: 110 BPM

## 2022-11-11 LAB
BILIRUB UR QL STRIP: NEGATIVE
CLARITY UR: CLEAR
COLOR UR: NORMAL
GLUCOSE UR STRIP-MCNC: NORMAL MG/DL
KETONES UR STRIP-SCNC: NEGATIVE MG/DL
LEUKOCYTE ESTERASE UR QL STRIP: NEGATIVE
NITRITE UR QL STRIP: NEGATIVE
PH UR STRIP: 6 PH UNITS
PROT UR QL STRIP: NEGATIVE
RBC # UR STRIP: NEGATIVE /UL
SP GR UR STRIP: 1.01
UROBILINOGEN UR STRIP-ACNC: NORMAL MG/DL

## 2022-11-11 PROCEDURE — 81003 URINALYSIS AUTO W/O SCOPE: CPT | Performed by: OBSTETRICS & GYNECOLOGY

## 2022-11-11 PROCEDURE — 99211 OFF/OP EST MAY X REQ PHY/QHP: CPT

## 2022-11-11 NOTE — H&P
[facility] -  Labor and Delivery  History & Physical  Obstetrics   Labor and Delivery Triage    SUBJECTIVE:     Patient Info:  Glenys Modi         32 y.o.    female    1990     Chief Complaint/Reason for Admission:  Sharp pains    History of Present Illness:  Patient is a 32-year-old  003 at 35 weeks and 1 day with complaint of sharp lower abdominal pain along with vaginal pain.  Feels like contractions.  This is been present for some time but more uncomfortable this morning.  Endorses good fetal movement.  No loss of fluid or bleeding.  No prior history of pre term labor.  No dysuria or constipation.    OB History:   OB History          4    Para   3    Term   3       0    AB   0    Living   3         SAB        IAB        Ectopic        Multiple        Live Births   3                   Estimated Date of Delivery: 12/15/22       Past Medical History:  Past Medical History:   Diagnosis Date    Anemia, unspecified     Hyperemesis     Sickle cell trait         Past Surgical History:  No past surgical history on file.    Social History:   Alcohol/Tobacco:  Social History     Tobacco Use    Smoking status: Never     Passive exposure: Current    Smokeless tobacco: Never   Substance Use Topics    Alcohol use: Never      Drug Use:  Social History     Substance and Sexual Activity   Drug Use Never       Family History:  Family History   Problem Relation Age of Onset    Miscarriages / Stillbirths Mother     Diabetes Mother        Allergies:  Review of patient's allergies indicates:  No Known Allergies    Meds Prior to Arrival:  Prior to Admission medications    Medication Sig Start Date End Date Taking? Authorizing Provider   NIFEdipine (PROCARDIA) 10 MG Cap TAKE ONE CAPSULE BY MOUTH EVERY 6 HOURS 22   Historical Provider   omeprazole (PRILOSEC OTC) 20 MG tablet Take 2 tablets (40 mg total) by mouth once daily. 22  Mana Andrea CNM   ondansetron (ZOFRAN-ODT) 8 MG AngeloDL  PLACE ONE TABLET ON THE top of TONGUE AND let DISSOLVE THEN swallow EVERY 6 HOURS AS NEEDED FOR NAUSEA 6/29/22   Historical Provider   prenat.vits,carlee,min-iron-folic (PRENATAL VITAMIN) Tab     Historical Provider   pyridoxine, vitamin B6, (B-6) 100 MG Tab 1 tablet.    Historical Provider   XULANE 150-35 mcg/24 hr PLACE ONE PATCH onto SKIN ONCE a week  Patient not taking: Reported on 9/21/2022 1/7/22   Liya Benitez CNM        Review of Systems:  Negative except as noted in HPI    OBJECTIVE:     Recent Vitals:  LMP 03/10/2022 (Approximate)     Exam:  General adult female no distress  HEENT NC/AT  Pulmonary normal effort  CV regular  Abdomen soft gravid nontender  Extremities benign  Psych normal affect  Skin warm and dry  Pelvic   Cx 1/40      T  CAT 1  [place] quiet    Lab Results:  Recent Results (from the past 36 hour(s))   Urinalysis, Reflex to Urine Culture    Collection Time: 11/11/22 11:33 AM    Specimen: Urine, Clean Catch   Result Value Ref Range    Color, UA Light Yellow Colorless, Straw, Yellow, Light Yellow, Dark Yellow    Clarity, UA Clear Clear    pH, UA 6.0 5.0 to 8.0 pH Units    Leukocytes, UA Negative Negative    Nitrites, UA Negative Negative    Protein, UA Negative Negative    Glucose, UA Normal Normal mg/dL    Ketones, UA Negative Negative mg/dL    Urobilinogen, UA Normal 0.2, 1.0, Normal mg/dL    Bilirubin, UA Negative Negative    Blood, UA Negative Negative    Specific Gravity, UA 1.014 <=1.030     [facility]: [facility]       Diagnostic Studies:              ASSESSMENT/PLAN:     35w1d   Not in labor   Home, reviewed precautions

## 2022-11-17 ENCOUNTER — ROUTINE PRENATAL (OUTPATIENT)
Dept: OBSTETRICS AND GYNECOLOGY | Facility: CLINIC | Age: 32
End: 2022-11-17
Payer: MEDICAID

## 2022-11-17 VITALS — BODY MASS INDEX: 27.46 KG/M2 | DIASTOLIC BLOOD PRESSURE: 70 MMHG | WEIGHT: 160 LBS | SYSTOLIC BLOOD PRESSURE: 110 MMHG

## 2022-11-17 DIAGNOSIS — Z3A.36 36 WEEKS GESTATION OF PREGNANCY: ICD-10-CM

## 2022-11-17 DIAGNOSIS — Z03.79 ENCOUNTER FOR OTHER SUSPECTED MATERNAL AND FETAL CONDITIONS RULED OUT: ICD-10-CM

## 2022-11-17 DIAGNOSIS — R35.0 URINARY FREQUENCY: ICD-10-CM

## 2022-11-17 DIAGNOSIS — Z34.83 ENCOUNTER FOR SUPERVISION OF OTHER NORMAL PREGNANCY IN THIRD TRIMESTER: Primary | ICD-10-CM

## 2022-11-17 PROCEDURE — 59425 ANTEPARTUM CARE ONLY: CPT | Mod: S$PBB,TH,, | Performed by: ADVANCED PRACTICE MIDWIFE

## 2022-11-17 PROCEDURE — 87653 STREP B DNA AMP PROBE: CPT | Mod: ,,, | Performed by: CLINICAL MEDICAL LABORATORY

## 2022-11-17 PROCEDURE — 87653 CULTURE, GROUP B STREP: ICD-10-PCS | Mod: ,,, | Performed by: CLINICAL MEDICAL LABORATORY

## 2022-11-17 PROCEDURE — 81003 URINALYSIS AUTO W/O SCOPE: CPT | Mod: PBBFAC | Performed by: ADVANCED PRACTICE MIDWIFE

## 2022-11-17 PROCEDURE — 99213 OFFICE O/P EST LOW 20 MIN: CPT | Mod: PBBFAC | Performed by: ADVANCED PRACTICE MIDWIFE

## 2022-11-17 PROCEDURE — 59425 PR ANTEPARTUM CARE ONLY, 4-6 VISITS: ICD-10-PCS | Mod: S$PBB,TH,, | Performed by: ADVANCED PRACTICE MIDWIFE

## 2022-11-17 NOTE — PROGRESS NOTES
GBS collected today.  Pt tearful.  C/O lower back pain and difficulty sleeping.  Has tried taking Benadryl and this does not help. Reviewed labor precautions and danger s/s to report including decreased fetal movements.

## 2022-11-19 LAB — CULTURE, GROUP B STREP: NORMAL

## 2022-11-28 ENCOUNTER — HOSPITAL ENCOUNTER (OUTPATIENT)
Dept: RADIOLOGY | Facility: HOSPITAL | Age: 32
Discharge: HOME | End: 2022-11-28
Attending: ADVANCED PRACTICE MIDWIFE
Payer: MEDICAID

## 2022-11-28 ENCOUNTER — ROUTINE PRENATAL (OUTPATIENT)
Dept: OBSTETRICS AND GYNECOLOGY | Facility: CLINIC | Age: 32
End: 2022-11-28
Payer: MEDICAID

## 2022-11-28 VITALS — DIASTOLIC BLOOD PRESSURE: 80 MMHG | SYSTOLIC BLOOD PRESSURE: 118 MMHG | WEIGHT: 160 LBS | BODY MASS INDEX: 27.46 KG/M2

## 2022-11-28 DIAGNOSIS — Z34.83 ENCOUNTER FOR SUPERVISION OF OTHER NORMAL PREGNANCY IN THIRD TRIMESTER: Primary | ICD-10-CM

## 2022-11-28 DIAGNOSIS — Z3A.37 37 WEEKS GESTATION OF PREGNANCY: ICD-10-CM

## 2022-11-28 DIAGNOSIS — Z03.79 ENCOUNTER FOR OTHER SUSPECTED MATERNAL AND FETAL CONDITIONS RULED OUT: ICD-10-CM

## 2022-11-28 DIAGNOSIS — R35.0 URINARY FREQUENCY: ICD-10-CM

## 2022-11-28 PROCEDURE — 99213 OFFICE O/P EST LOW 20 MIN: CPT | Mod: PBBFAC | Performed by: ADVANCED PRACTICE MIDWIFE

## 2022-11-28 PROCEDURE — 81003 URINALYSIS AUTO W/O SCOPE: CPT | Mod: PBBFAC | Performed by: ADVANCED PRACTICE MIDWIFE

## 2022-11-28 PROCEDURE — 59425 ANTEPARTUM CARE ONLY: CPT | Mod: S$PBB,TH,, | Performed by: ADVANCED PRACTICE MIDWIFE

## 2022-11-28 PROCEDURE — 59425 PR ANTEPARTUM CARE ONLY, 4-6 VISITS: ICD-10-PCS | Mod: S$PBB,TH,, | Performed by: ADVANCED PRACTICE MIDWIFE

## 2022-11-28 PROCEDURE — 76816 OB US FOLLOW-UP PER FETUS: CPT | Mod: TC

## 2022-11-28 PROCEDURE — 76816 OB US FOLLOW-UP PER FETUS: CPT | Mod: 26,,, | Performed by: RADIOLOGY

## 2022-11-28 PROCEDURE — 76816 US OB FOLLOW UP EA GESTATION TRANSABDOMINAL: ICD-10-PCS | Mod: 26,,, | Performed by: RADIOLOGY

## 2022-11-28 NOTE — PROGRESS NOTES
37w4d, .  US today.   Placenta 4.9cm from cervical os.  NAE 9.3cm.  Vertex position.  EFW 6#7oz.  Wants to proceed with induction at 39 weeks if no spontaneous labor.  Discussed R/B/A and pt verb understanding. Reviewed labor precautions and danger s/s to report including decreased fetal movements.

## 2022-12-01 ENCOUNTER — ANESTHESIA EVENT (OUTPATIENT)
Dept: OBSTETRICS AND GYNECOLOGY | Facility: HOSPITAL | Age: 32
End: 2022-12-01
Payer: MEDICAID

## 2022-12-01 ENCOUNTER — PATIENT MESSAGE (OUTPATIENT)
Dept: OBSTETRICS AND GYNECOLOGY | Facility: CLINIC | Age: 32
End: 2022-12-01
Payer: MEDICAID

## 2022-12-01 ENCOUNTER — ANESTHESIA (OUTPATIENT)
Dept: OBSTETRICS AND GYNECOLOGY | Facility: HOSPITAL | Age: 32
End: 2022-12-01
Payer: MEDICAID

## 2022-12-01 ENCOUNTER — HOSPITAL ENCOUNTER (INPATIENT)
Facility: HOSPITAL | Age: 32
LOS: 2 days | Discharge: HOME | End: 2022-12-03
Attending: OBSTETRICS & GYNECOLOGY | Admitting: OBSTETRICS & GYNECOLOGY
Payer: MEDICAID

## 2022-12-01 DIAGNOSIS — R10.2 PELVIC PAIN IN PREGNANCY, ANTEPARTUM, THIRD TRIMESTER: ICD-10-CM

## 2022-12-01 DIAGNOSIS — Z3A.38 38 WEEKS GESTATION OF PREGNANCY: ICD-10-CM

## 2022-12-01 DIAGNOSIS — R10.2 PELVIC PAIN AFFECTING PREGNANCY IN THIRD TRIMESTER, ANTEPARTUM: Primary | ICD-10-CM

## 2022-12-01 DIAGNOSIS — O26.893 PELVIC PAIN AFFECTING PREGNANCY IN THIRD TRIMESTER, ANTEPARTUM: Primary | ICD-10-CM

## 2022-12-01 DIAGNOSIS — O26.893 PELVIC PAIN IN PREGNANCY, ANTEPARTUM, THIRD TRIMESTER: ICD-10-CM

## 2022-12-01 LAB
ALBUMIN SERPL BCP-MCNC: 2.7 G/DL (ref 3.5–5)
ALBUMIN/GLOB SERPL: 0.6 {RATIO}
ALP SERPL-CCNC: 116 U/L (ref 37–98)
ALT SERPL W P-5'-P-CCNC: 12 U/L (ref 13–56)
ANION GAP SERPL CALCULATED.3IONS-SCNC: 16 MMOL/L (ref 7–16)
ANISOCYTOSIS BLD QL SMEAR: ABNORMAL
AST SERPL W P-5'-P-CCNC: 19 U/L (ref 15–37)
BACTERIA #/AREA URNS HPF: ABNORMAL /HPF
BASOPHILS # BLD AUTO: 0.04 K/UL (ref 0–0.2)
BASOPHILS NFR BLD AUTO: 0.3 % (ref 0–1)
BILIRUB SERPL-MCNC: 0.6 MG/DL (ref ?–1.2)
BILIRUB UR QL STRIP: NEGATIVE
BUN SERPL-MCNC: 5 MG/DL (ref 7–18)
BUN/CREAT SERPL: 8 (ref 6–20)
CALCIUM SERPL-MCNC: 9 MG/DL (ref 8.5–10.1)
CHLORIDE SERPL-SCNC: 103 MMOL/L (ref 98–107)
CLARITY UR: CLEAR
CO2 SERPL-SCNC: 22 MMOL/L (ref 21–32)
COLOR UR: ABNORMAL
CREAT SERPL-MCNC: 0.6 MG/DL (ref 0.55–1.02)
DIFFERENTIAL METHOD BLD: ABNORMAL
EGFR (NO RACE VARIABLE) (RUSH/TITUS): 122 ML/MIN/1.73M²
EOSINOPHIL # BLD AUTO: 0.03 K/UL (ref 0–0.5)
EOSINOPHIL NFR BLD AUTO: 0.3 % (ref 1–4)
ERYTHROCYTE [DISTWIDTH] IN BLOOD BY AUTOMATED COUNT: 17.2 % (ref 11.5–14.5)
GLOBULIN SER-MCNC: 4.2 G/DL (ref 2–4)
GLUCOSE SERPL-MCNC: 77 MG/DL (ref 74–106)
GLUCOSE UR STRIP-MCNC: NORMAL MG/DL
HBV SURFACE AG SERPL QL IA: NORMAL
HCT VFR BLD AUTO: 26.1 % (ref 38–47)
HGB BLD-MCNC: 7.9 G/DL (ref 12–16)
HIV 1+O+2 AB SERPL QL: NORMAL
IMM GRANULOCYTES # BLD AUTO: 0.11 K/UL (ref 0–0.04)
IMM GRANULOCYTES NFR BLD: 0.9 % (ref 0–0.4)
KETONES UR STRIP-SCNC: NEGATIVE MG/DL
LEUKOCYTE ESTERASE UR QL STRIP: ABNORMAL
LYMPHOCYTES # BLD AUTO: 2 K/UL (ref 1–4.8)
LYMPHOCYTES NFR BLD AUTO: 17.1 % (ref 27–41)
MCH RBC QN AUTO: 19.7 PG (ref 27–31)
MCHC RBC AUTO-ENTMCNC: 30.3 G/DL (ref 32–36)
MCV RBC AUTO: 65.1 FL (ref 80–96)
MICROCYTES BLD QL SMEAR: ABNORMAL
MONOCYTES # BLD AUTO: 0.9 K/UL (ref 0–0.8)
MONOCYTES NFR BLD AUTO: 7.7 % (ref 2–6)
MPC BLD CALC-MCNC: ABNORMAL G/DL
NEUTROPHILS # BLD AUTO: 8.59 K/UL (ref 1.8–7.7)
NEUTROPHILS NFR BLD AUTO: 73.7 % (ref 53–65)
NITRITE UR QL STRIP: NEGATIVE
NRBC # BLD AUTO: 0.04 X10E3/UL
NRBC, AUTO (.00): 0.3 %
OVALOCYTES BLD QL SMEAR: ABNORMAL
PH UR STRIP: 7 PH UNITS
PLATELET # BLD AUTO: 212 K/UL (ref 150–400)
PLATELET MORPHOLOGY: ABNORMAL
POLYCHROMASIA BLD QL SMEAR: ABNORMAL
POTASSIUM SERPL-SCNC: 3.5 MMOL/L (ref 3.5–5.1)
PROT SERPL-MCNC: 6.9 G/DL (ref 6.4–8.2)
PROT UR QL STRIP: NEGATIVE
RBC # BLD AUTO: 4.01 M/UL (ref 4.2–5.4)
RBC # UR STRIP: ABNORMAL /UL
SCHISTOCYTES BLD QL AUTO: ABNORMAL
SODIUM SERPL-SCNC: 137 MMOL/L (ref 136–145)
SP GR UR STRIP: 1.01
SQUAMOUS #/AREA URNS LPF: ABNORMAL /LPF
SYPHILIS AB INTERPRETATION: NORMAL
UROBILINOGEN UR STRIP-ACNC: NORMAL MG/DL
WBC # BLD AUTO: 11.67 K/UL (ref 4.5–11)
WBC #/AREA URNS HPF: ABNORMAL /HPF

## 2022-12-01 PROCEDURE — 25000003 PHARM REV CODE 250: Performed by: ANESTHESIOLOGY

## 2022-12-01 PROCEDURE — 86780 TREPONEMA PALLIDUM: CPT | Performed by: OBSTETRICS & GYNECOLOGY

## 2022-12-01 PROCEDURE — 81001 URINALYSIS AUTO W/SCOPE: CPT | Performed by: OBSTETRICS & GYNECOLOGY

## 2022-12-01 PROCEDURE — 62326 NJX INTERLAMINAR LMBR/SAC: CPT | Performed by: ANESTHESIOLOGY

## 2022-12-01 PROCEDURE — 59410 PRA OBSTE CARE,VAG DELIV+POSTPARTUM: ICD-10-PCS | Mod: ,,, | Performed by: ANESTHESIOLOGY

## 2022-12-01 PROCEDURE — 63600175 PHARM REV CODE 636 W HCPCS: Performed by: ANESTHESIOLOGY

## 2022-12-01 PROCEDURE — 80053 COMPREHEN METABOLIC PANEL: CPT | Performed by: OBSTETRICS & GYNECOLOGY

## 2022-12-01 PROCEDURE — 87340 HEPATITIS B SURFACE AG IA: CPT | Performed by: OBSTETRICS & GYNECOLOGY

## 2022-12-01 PROCEDURE — 11000001 HC ACUTE MED/SURG PRIVATE ROOM

## 2022-12-01 PROCEDURE — 63600175 PHARM REV CODE 636 W HCPCS: Performed by: OBSTETRICS & GYNECOLOGY

## 2022-12-01 PROCEDURE — 59410 PR OBSTE CARE,VAG DELIV+POSTPARTUM: ICD-10-PCS | Mod: TH,,, | Performed by: ADVANCED PRACTICE MIDWIFE

## 2022-12-01 PROCEDURE — 36415 COLL VENOUS BLD VENIPUNCTURE: CPT | Performed by: OBSTETRICS & GYNECOLOGY

## 2022-12-01 PROCEDURE — 59410 OBSTETRICAL CARE: CPT | Mod: TH,,, | Performed by: ADVANCED PRACTICE MIDWIFE

## 2022-12-01 PROCEDURE — 87389 HIV-1 AG W/HIV-1&-2 AB AG IA: CPT | Performed by: OBSTETRICS & GYNECOLOGY

## 2022-12-01 PROCEDURE — 86901 BLOOD TYPING SEROLOGIC RH(D): CPT | Performed by: OBSTETRICS & GYNECOLOGY

## 2022-12-01 PROCEDURE — 87086 URINE CULTURE/COLONY COUNT: CPT | Performed by: OBSTETRICS & GYNECOLOGY

## 2022-12-01 PROCEDURE — 81003 URINALYSIS AUTO W/O SCOPE: CPT | Performed by: OBSTETRICS & GYNECOLOGY

## 2022-12-01 PROCEDURE — 86900 BLOOD TYPING SEROLOGIC ABO: CPT | Performed by: OBSTETRICS & GYNECOLOGY

## 2022-12-01 PROCEDURE — 85025 COMPLETE CBC W/AUTO DIFF WBC: CPT | Performed by: OBSTETRICS & GYNECOLOGY

## 2022-12-01 PROCEDURE — 59410 OBSTETRICAL CARE: CPT | Mod: ,,, | Performed by: ANESTHESIOLOGY

## 2022-12-01 RX ORDER — METHYLERGONOVINE MALEATE 0.2 MG/ML
200 INJECTION INTRAVENOUS
Status: DISCONTINUED | OUTPATIENT
Start: 2022-12-01 | End: 2022-12-03 | Stop reason: HOSPADM

## 2022-12-01 RX ORDER — OXYTOCIN/RINGER'S LACTATE 30/500 ML
95 PLASTIC BAG, INJECTION (ML) INTRAVENOUS ONCE
Status: DISCONTINUED | OUTPATIENT
Start: 2022-12-01 | End: 2022-12-02

## 2022-12-01 RX ORDER — DIPHENHYDRAMINE HYDROCHLORIDE 50 MG/ML
25 INJECTION INTRAMUSCULAR; INTRAVENOUS EVERY 4 HOURS PRN
Status: DISCONTINUED | OUTPATIENT
Start: 2022-12-01 | End: 2022-12-03 | Stop reason: HOSPADM

## 2022-12-01 RX ORDER — PRENATAL WITH FERROUS FUM AND FOLIC ACID 3080; 920; 120; 400; 22; 1.84; 3; 20; 10; 1; 12; 200; 27; 25; 2 [IU]/1; [IU]/1; MG/1; [IU]/1; MG/1; MG/1; MG/1; MG/1; MG/1; MG/1; UG/1; MG/1; MG/1; MG/1; MG/1
1 TABLET ORAL DAILY
Status: DISCONTINUED | OUTPATIENT
Start: 2022-12-02 | End: 2022-12-03 | Stop reason: HOSPADM

## 2022-12-01 RX ORDER — SODIUM CHLORIDE 0.9 % (FLUSH) 0.9 %
10 SYRINGE (ML) INJECTION
Status: DISCONTINUED | OUTPATIENT
Start: 2022-12-01 | End: 2022-12-03 | Stop reason: HOSPADM

## 2022-12-01 RX ORDER — IBUPROFEN 800 MG/1
800 TABLET ORAL EVERY 6 HOURS PRN
Status: DISCONTINUED | OUTPATIENT
Start: 2022-12-01 | End: 2022-12-03 | Stop reason: HOSPADM

## 2022-12-01 RX ORDER — SODIUM CHLORIDE 9 MG/ML
INJECTION, SOLUTION INTRAVENOUS
Status: DISCONTINUED | OUTPATIENT
Start: 2022-12-01 | End: 2022-12-02

## 2022-12-01 RX ORDER — ACETAMINOPHEN 325 MG/1
650 TABLET ORAL EVERY 6 HOURS PRN
Status: DISCONTINUED | OUTPATIENT
Start: 2022-12-01 | End: 2022-12-02

## 2022-12-01 RX ORDER — FENTANYL/ROPIVACAINE/NS/PF 2MCG/ML-.2
10 PLASTIC BAG, INJECTION (ML) INJECTION CONTINUOUS
Status: DISCONTINUED | OUTPATIENT
Start: 2022-12-01 | End: 2022-12-01

## 2022-12-01 RX ORDER — ONDANSETRON 4 MG/1
8 TABLET, ORALLY DISINTEGRATING ORAL EVERY 8 HOURS PRN
Status: DISCONTINUED | OUTPATIENT
Start: 2022-12-01 | End: 2022-12-03 | Stop reason: HOSPADM

## 2022-12-01 RX ORDER — CALCIUM CARBONATE 200(500)MG
500 TABLET,CHEWABLE ORAL 3 TIMES DAILY PRN
Status: DISCONTINUED | OUTPATIENT
Start: 2022-12-01 | End: 2022-12-02

## 2022-12-01 RX ORDER — BUTORPHANOL TARTRATE 2 MG/ML
1 INJECTION INTRAMUSCULAR; INTRAVENOUS
Status: DISCONTINUED | OUTPATIENT
Start: 2022-12-01 | End: 2022-12-01

## 2022-12-01 RX ORDER — ACETAMINOPHEN 325 MG/1
650 TABLET ORAL EVERY 6 HOURS PRN
Status: DISCONTINUED | OUTPATIENT
Start: 2022-12-01 | End: 2022-12-03 | Stop reason: HOSPADM

## 2022-12-01 RX ORDER — SODIUM CHLORIDE, SODIUM LACTATE, POTASSIUM CHLORIDE, CALCIUM CHLORIDE 600; 310; 30; 20 MG/100ML; MG/100ML; MG/100ML; MG/100ML
INJECTION, SOLUTION INTRAVENOUS CONTINUOUS
Status: DISCONTINUED | OUTPATIENT
Start: 2022-12-01 | End: 2022-12-01

## 2022-12-01 RX ORDER — PROCHLORPERAZINE EDISYLATE 5 MG/ML
5 INJECTION INTRAMUSCULAR; INTRAVENOUS EVERY 6 HOURS PRN
Status: DISCONTINUED | OUTPATIENT
Start: 2022-12-01 | End: 2022-12-03 | Stop reason: HOSPADM

## 2022-12-01 RX ORDER — CARBOPROST TROMETHAMINE 250 UG/ML
250 INJECTION, SOLUTION INTRAMUSCULAR
Status: DISCONTINUED | OUTPATIENT
Start: 2022-12-01 | End: 2022-12-03 | Stop reason: HOSPADM

## 2022-12-01 RX ORDER — OXYTOCIN/RINGER'S LACTATE 30/500 ML
334 PLASTIC BAG, INJECTION (ML) INTRAVENOUS ONCE
Status: DISCONTINUED | OUTPATIENT
Start: 2022-12-01 | End: 2022-12-03 | Stop reason: HOSPADM

## 2022-12-01 RX ORDER — DOCUSATE SODIUM 100 MG/1
200 CAPSULE, LIQUID FILLED ORAL 2 TIMES DAILY PRN
Status: DISCONTINUED | OUTPATIENT
Start: 2022-12-01 | End: 2022-12-03 | Stop reason: HOSPADM

## 2022-12-01 RX ORDER — SODIUM CITRATE AND CITRIC ACID MONOHYDRATE 334; 500 MG/5ML; MG/5ML
30 SOLUTION ORAL ONCE AS NEEDED
Status: DISCONTINUED | OUTPATIENT
Start: 2022-12-01 | End: 2022-12-02

## 2022-12-01 RX ORDER — ROPIVACAINE HYDROCHLORIDE 7.5 MG/ML
INJECTION, SOLUTION EPIDURAL; PERINEURAL
Status: COMPLETED | OUTPATIENT
Start: 2022-12-01 | End: 2022-12-01

## 2022-12-01 RX ORDER — OXYTOCIN/RINGER'S LACTATE 30/500 ML
95 PLASTIC BAG, INJECTION (ML) INTRAVENOUS ONCE
Status: DISCONTINUED | OUTPATIENT
Start: 2022-12-01 | End: 2022-12-03 | Stop reason: HOSPADM

## 2022-12-01 RX ORDER — SIMETHICONE 80 MG
1 TABLET,CHEWABLE ORAL 4 TIMES DAILY PRN
Status: DISCONTINUED | OUTPATIENT
Start: 2022-12-01 | End: 2022-12-03 | Stop reason: HOSPADM

## 2022-12-01 RX ORDER — CEFAZOLIN SODIUM 2 G/50ML
2 SOLUTION INTRAVENOUS ONCE AS NEEDED
Status: DISCONTINUED | OUTPATIENT
Start: 2022-12-01 | End: 2022-12-01

## 2022-12-01 RX ORDER — ACETAMINOPHEN AND CODEINE PHOSPHATE 300; 30 MG/1; MG/1
2 TABLET ORAL EVERY 4 HOURS PRN
Status: DISCONTINUED | OUTPATIENT
Start: 2022-12-01 | End: 2022-12-03 | Stop reason: HOSPADM

## 2022-12-01 RX ORDER — ACETAMINOPHEN AND CODEINE PHOSPHATE 300; 30 MG/1; MG/1
1 TABLET ORAL EVERY 4 HOURS PRN
Status: DISCONTINUED | OUTPATIENT
Start: 2022-12-01 | End: 2022-12-03 | Stop reason: HOSPADM

## 2022-12-01 RX ORDER — DIPHENOXYLATE HYDROCHLORIDE AND ATROPINE SULFATE 2.5; .025 MG/1; MG/1
1 TABLET ORAL 4 TIMES DAILY PRN
Status: DISCONTINUED | OUTPATIENT
Start: 2022-12-01 | End: 2022-12-03 | Stop reason: HOSPADM

## 2022-12-01 RX ORDER — FAMOTIDINE 10 MG/ML
20 INJECTION INTRAVENOUS ONCE AS NEEDED
Status: DISCONTINUED | OUTPATIENT
Start: 2022-12-01 | End: 2022-12-02

## 2022-12-01 RX ORDER — MISOPROSTOL 200 UG/1
800 TABLET ORAL
Status: DISCONTINUED | OUTPATIENT
Start: 2022-12-01 | End: 2022-12-03 | Stop reason: HOSPADM

## 2022-12-01 RX ORDER — IRON,CARBONYL/ASCORBIC ACID 100-250 MG
1 TABLET ORAL 2 TIMES DAILY
Status: DISCONTINUED | OUTPATIENT
Start: 2022-12-01 | End: 2022-12-03 | Stop reason: HOSPADM

## 2022-12-01 RX ORDER — KETOROLAC TROMETHAMINE 10 MG/1
10 TABLET, FILM COATED ORAL EVERY 6 HOURS PRN
Status: DISCONTINUED | OUTPATIENT
Start: 2022-12-01 | End: 2022-12-03 | Stop reason: HOSPADM

## 2022-12-01 RX ORDER — OXYTOCIN/RINGER'S LACTATE 30/500 ML
0-30 PLASTIC BAG, INJECTION (ML) INTRAVENOUS CONTINUOUS
Status: DISCONTINUED | OUTPATIENT
Start: 2022-12-01 | End: 2022-12-01

## 2022-12-01 RX ORDER — HYDROCORTISONE 25 MG/G
CREAM TOPICAL 3 TIMES DAILY PRN
Status: DISCONTINUED | OUTPATIENT
Start: 2022-12-01 | End: 2022-12-03 | Stop reason: HOSPADM

## 2022-12-01 RX ORDER — EPHEDRINE SULFATE 50 MG/ML
10 INJECTION, SOLUTION INTRAVENOUS ONCE AS NEEDED
Status: DISCONTINUED | OUTPATIENT
Start: 2022-12-01 | End: 2022-12-02

## 2022-12-01 RX ORDER — DIPHENHYDRAMINE HCL 25 MG
25 CAPSULE ORAL EVERY 4 HOURS PRN
Status: DISCONTINUED | OUTPATIENT
Start: 2022-12-01 | End: 2022-12-03 | Stop reason: HOSPADM

## 2022-12-01 RX ORDER — ONDANSETRON 2 MG/ML
4 INJECTION INTRAMUSCULAR; INTRAVENOUS EVERY 6 HOURS PRN
Status: DISCONTINUED | OUTPATIENT
Start: 2022-12-01 | End: 2022-12-02

## 2022-12-01 RX ORDER — SIMETHICONE 80 MG
1 TABLET,CHEWABLE ORAL EVERY 6 HOURS PRN
Status: DISCONTINUED | OUTPATIENT
Start: 2022-12-01 | End: 2022-12-03 | Stop reason: HOSPADM

## 2022-12-01 RX ORDER — LIDOCAINE HYDROCHLORIDE 10 MG/ML
10 INJECTION INFILTRATION; PERINEURAL ONCE AS NEEDED
Status: DISCONTINUED | OUTPATIENT
Start: 2022-12-01 | End: 2022-12-02

## 2022-12-01 RX ADMIN — ROPIVACAINE HYDROCHLORIDE 10 ML/HR: 10 INJECTION, SOLUTION EPIDURAL at 06:12

## 2022-12-01 RX ADMIN — SODIUM CHLORIDE, POTASSIUM CHLORIDE, SODIUM LACTATE AND CALCIUM CHLORIDE: 600; 310; 30; 20 INJECTION, SOLUTION INTRAVENOUS at 05:12

## 2022-12-01 RX ADMIN — ROPIVACAINE HYDROCHLORIDE 30 ML: 7.5 INJECTION, SOLUTION EPIDURAL; PERINEURAL at 06:12

## 2022-12-01 RX ADMIN — BUTORPHANOL TARTRATE 1 MG: 2 INJECTION, SOLUTION INTRAMUSCULAR; INTRAVENOUS at 05:12

## 2022-12-01 NOTE — HPI
22  Pt of KAYLIN Andrea, ANDREAS    31yo  at 38w0d coming with contractions.    Was checked on Monday and was 2cm.    On arrival 3/70/-1    Over approx 3.5-4hrs  progressed to /-1  Admitted in labor      Denies vaginal bleeding, leakage of fluid  Reports good fetal movement    GBS negative

## 2022-12-02 PROBLEM — R10.2 PELVIC PAIN AFFECTING PREGNANCY IN THIRD TRIMESTER, ANTEPARTUM: Status: RESOLVED | Noted: 2022-12-01 | Resolved: 2022-12-02

## 2022-12-02 PROBLEM — O26.893 PELVIC PAIN AFFECTING PREGNANCY IN THIRD TRIMESTER, ANTEPARTUM: Status: RESOLVED | Noted: 2022-12-01 | Resolved: 2022-12-02

## 2022-12-02 PROBLEM — O99.013 ANEMIA AFFECTING PREGNANCY IN THIRD TRIMESTER: Status: ACTIVE | Noted: 2022-12-02

## 2022-12-02 LAB
ANISOCYTOSIS BLD QL SMEAR: ABNORMAL
BASOPHILS # BLD AUTO: 0.04 K/UL (ref 0–0.2)
BASOPHILS NFR BLD AUTO: 0.3 % (ref 0–1)
DIFFERENTIAL METHOD BLD: ABNORMAL
EOSINOPHIL # BLD AUTO: 0.02 K/UL (ref 0–0.5)
EOSINOPHIL NFR BLD AUTO: 0.2 % (ref 1–4)
ERYTHROCYTE [DISTWIDTH] IN BLOOD BY AUTOMATED COUNT: 17.1 % (ref 11.5–14.5)
HCT VFR BLD AUTO: 22.8 % (ref 38–47)
HGB BLD-MCNC: 6.8 G/DL (ref 12–16)
IMM GRANULOCYTES # BLD AUTO: 0.11 K/UL (ref 0–0.04)
IMM GRANULOCYTES NFR BLD: 0.9 % (ref 0–0.4)
INDIRECT COOMBS: NORMAL
LYMPHOCYTES # BLD AUTO: 1.8 K/UL (ref 1–4.8)
LYMPHOCYTES NFR BLD AUTO: 14.1 % (ref 27–41)
LYMPHOCYTES NFR BLD MANUAL: 15 % (ref 27–41)
MCH RBC QN AUTO: 19.8 PG (ref 27–31)
MCHC RBC AUTO-ENTMCNC: 29.8 G/DL (ref 32–36)
MCV RBC AUTO: 66.5 FL (ref 80–96)
MICROCYTES BLD QL SMEAR: ABNORMAL
MONOCYTES # BLD AUTO: 1.23 K/UL (ref 0–0.8)
MONOCYTES NFR BLD AUTO: 9.6 % (ref 2–6)
MONOCYTES NFR BLD MANUAL: 9 % (ref 2–6)
MPC BLD CALC-MCNC: ABNORMAL G/DL
NEUTROPHILS # BLD AUTO: 9.59 K/UL (ref 1.8–7.7)
NEUTROPHILS NFR BLD AUTO: 74.9 % (ref 53–65)
NEUTS BAND NFR BLD MANUAL: 6 % (ref 1–5)
NEUTS SEG NFR BLD MANUAL: 70 % (ref 50–62)
NRBC # BLD AUTO: 0.03 X10E3/UL
NRBC, AUTO (.00): 0.2 %
OVALOCYTES BLD QL SMEAR: ABNORMAL
PLATELET # BLD AUTO: 180 K/UL (ref 150–400)
PLATELET MORPHOLOGY: ABNORMAL
POIKILOCYTOSIS BLD QL SMEAR: ABNORMAL
POLYCHROMASIA BLD QL SMEAR: ABNORMAL
RBC # BLD AUTO: 3.43 M/UL (ref 4.2–5.4)
RH BLD: NORMAL
RUBV IGG SER-ACNC: 25.3 IU/ML
TARGETS BLD QL SMEAR: ABNORMAL
WBC # BLD AUTO: 12.79 K/UL (ref 4.5–11)

## 2022-12-02 PROCEDURE — 36415 COLL VENOUS BLD VENIPUNCTURE: CPT | Performed by: ADVANCED PRACTICE MIDWIFE

## 2022-12-02 PROCEDURE — 27000980 HC MATTRESS, OVERLAY WAFFLE

## 2022-12-02 PROCEDURE — 72200005 HC VAGINAL DELIVERY LEVEL II

## 2022-12-02 PROCEDURE — 85025 COMPLETE CBC W/AUTO DIFF WBC: CPT | Performed by: ADVANCED PRACTICE MIDWIFE

## 2022-12-02 PROCEDURE — 96361 HYDRATE IV INFUSION ADD-ON: CPT

## 2022-12-02 PROCEDURE — 86762 RUBELLA ANTIBODY: CPT | Performed by: STUDENT IN AN ORGANIZED HEALTH CARE EDUCATION/TRAINING PROGRAM

## 2022-12-02 PROCEDURE — 72100002 HC LABOR CARE, 1ST 8 HOURS

## 2022-12-02 PROCEDURE — 51701 INSERT BLADDER CATHETER: CPT

## 2022-12-02 PROCEDURE — 63600175 PHARM REV CODE 636 W HCPCS: Performed by: ADVANCED PRACTICE MIDWIFE

## 2022-12-02 PROCEDURE — 25000003 PHARM REV CODE 250: Performed by: ADVANCED PRACTICE MIDWIFE

## 2022-12-02 PROCEDURE — 90471 IMMUNIZATION ADMIN: CPT | Performed by: ADVANCED PRACTICE MIDWIFE

## 2022-12-02 PROCEDURE — 96360 HYDRATION IV INFUSION INIT: CPT

## 2022-12-02 PROCEDURE — 36415 COLL VENOUS BLD VENIPUNCTURE: CPT | Performed by: STUDENT IN AN ORGANIZED HEALTH CARE EDUCATION/TRAINING PROGRAM

## 2022-12-02 PROCEDURE — 90715 TDAP VACCINE 7 YRS/> IM: CPT | Performed by: ADVANCED PRACTICE MIDWIFE

## 2022-12-02 PROCEDURE — 11000001 HC ACUTE MED/SURG PRIVATE ROOM

## 2022-12-02 RX ORDER — IBUPROFEN 800 MG/1
800 TABLET ORAL EVERY 6 HOURS PRN
Qty: 30 TABLET | Refills: 3 | OUTPATIENT
Start: 2022-12-02 | End: 2023-02-05

## 2022-12-02 RX ORDER — ACETAMINOPHEN AND CODEINE PHOSPHATE 300; 30 MG/1; MG/1
2 TABLET ORAL EVERY 6 HOURS PRN
Qty: 20 TABLET | Refills: 0 | Status: SHIPPED | OUTPATIENT
Start: 2022-12-02 | End: 2022-12-12

## 2022-12-02 RX ORDER — IRON,CARBONYL/ASCORBIC ACID 100-250 MG
1 TABLET ORAL 2 TIMES DAILY
Qty: 60 TABLET | Refills: 3 | OUTPATIENT
Start: 2022-12-02 | End: 2023-02-05

## 2022-12-02 RX ADMIN — Medication 1 TABLET: at 09:12

## 2022-12-02 RX ADMIN — TETANUS TOXOID, REDUCED DIPHTHERIA TOXOID AND ACELLULAR PERTUSSIS VACCINE, ADSORBED 0.5 ML: 5; 2.5; 8; 8; 2.5 SUSPENSION INTRAMUSCULAR at 09:12

## 2022-12-02 RX ADMIN — ACETAMINOPHEN AND CODEINE PHOSPHATE 2 TABLET: 300; 30 TABLET ORAL at 05:12

## 2022-12-02 RX ADMIN — Medication: at 05:12

## 2022-12-02 RX ADMIN — ACETAMINOPHEN AND CODEINE PHOSPHATE 2 TABLET: 300; 30 TABLET ORAL at 12:12

## 2022-12-02 RX ADMIN — DIPHENHYDRAMINE HYDROCHLORIDE 25 MG: 25 CAPSULE ORAL at 09:12

## 2022-12-02 RX ADMIN — ACETAMINOPHEN AND CODEINE PHOSPHATE 2 TABLET: 300; 30 TABLET ORAL at 07:12

## 2022-12-02 NOTE — ANESTHESIA PROCEDURE NOTES
Epidural    Patient location during procedure: OB   Reason for block: primary anesthetic   Reason for block: labor analgesia requested by patient and obstetrician  Diagnosis: iup, term   Start time: 12/1/2022 6:36 PM  Timeout: 12/1/2022 6:36 PM    Staffing  Performing Provider: Jim Friedman MD  Authorizing Provider: Jim Friedman MD        Preanesthetic Checklist  Completed: patient identified, IV checked, site marked, risks and benefits discussed, surgical consent, monitors and equipment checked, pre-op evaluation, timeout performed, anesthesia consent given, hand hygiene performed and patient being monitored  Preparation  Patient position: sitting  Prep: Betadine  Patient monitoring: ECG, Pulse Ox, continuous capnometry and Blood Pressure  Reason for block: primary anesthetic   Epidural  Skin Anesthetic: lidocaine 1%  Administration type: continuous  Approach: midline  Interspace: L3-4    Injection technique: EMILIE saline  Needle and Epidural Catheter  Needle type: Tuohy   Needle gauge: 18  Needle length: 3.5 inches  Catheter type: end hole  Insertion Attempts: 1  Test dose: 3 mL of lidocaine 1.5% with Epi 1-to-200,000  Additional Documentation: incremental injection  Needle localization: anatomical landmarks  Assessment  Ease of block: easy  Patient's tolerance of the procedure: comfortable throughout block No inadvertent dural puncture with Tuohy.  Dural puncture not performed with spinal needle    Medications:    Medications: ROPIvacaine (NAROPIN) injection 0.75% - Epidural   30 mL - 12/1/2022 6:36:00 PM

## 2022-12-02 NOTE — PROGRESS NOTES
[facility] -  Labor and Delivery  Obstetrics  Postpartum Progress Note    Patient Name: Glenys Modi  MRN: [id number]  Admission Date: 12/1/2022  Hospital Length of Stay: 1 days  Attending Physician: Jonah Allen, *  Primary Care Provider: Mana Andrea CNM    Subjective:     Principal Problem:Encounter for vaginal delivery    Hospital course: Postpartum Day 1.  Pt is awake and alert, in bed holding infant with adequate bonding observed.      Interval History: Vaginal Delivery on 12/1/22, epidural anesthesia, no complications    She is doing well this morning. She is tolerating a regular diet without nausea or vomiting. She is voiding spontaneously. She is ambulating. She has passed flatus, and has not a BM. Vaginal bleeding is moderate. She denies fever or chills. Abdominal pain is mild and controlled with oral medications. She Is not breastfeeding. She desires circumcision for her male baby: yes.     Objective:     Vital Signs (Most Recent):  Temp: 98.2 °F (36.8 °C) (12/02/22 0416)  Pulse: 75 (12/02/22 0416)  Resp: (!) 24 (12/02/22 0721)  BP: 116/70 (12/02/22 0416)  SpO2: 99 % (12/01/22 1906)   Vital Signs (24h Range):  Temp:  [98 °F (36.7 °C)-98.6 °F (37 °C)] 98.2 °F (36.8 °C)  Pulse:  [65-98] 75  Resp:  [18-24] 24  SpO2:  [99 %] 99 %  BP: (112-199)/() 116/70     Weight: 74.1 kg (163 lb 6.4 oz)  Body mass index is 28.05 kg/m².    No intake or output data in the 24 hours ending 12/02/22 0914    Physical Exam:   Constitutional: She is oriented to person, place, and time. She appears well-developed and well-nourished.    HENT:   Head: Normocephalic.      Cardiovascular:  Normal rate and regular rhythm.             Pulmonary/Chest: Effort normal and breath sounds normal.        Abdominal: Soft. Bowel sounds are normal.   Fundus firm, non-tender     Genitourinary:    Genitourinary Comments: Perineum well-approximated             Musculoskeletal: Normal range of motion.       Neurological: She  is alert and oriented to person, place, and time.    Skin: Skin is warm and dry.    Psychiatric: She has a normal mood and affect.     Significant Labs:  Lab Results   Component Value Date    GROUPTRH B POS 2022    HEPBSAG Non-Reactive 2022     Recent Labs   Lab 22  0340   HGB 6.8*   HCT 22.8*       CBC:   Recent Labs   Lab 22  0340   WBC 12.79*   RBC 3.43*   HGB 6.8*   HCT 22.8*      MCV 66.5*   MCH 19.8*   MCHC 29.8*     CMP:   Recent Labs   Lab 22  1741   GLU 77   CALCIUM 9.0   ALBUMIN 2.7*   PROT 6.9      K 3.5   CO2 22      BUN 5*   CREATININE 0.60   ALKPHOS 116*   ALT 12*   AST 19   BILITOT 0.6     I have personallly reviewed all pertinent lab results from the last 24 hours.    Assessment/Plan:     32 y.o. female  at 38w1d for:    Active Diagnoses:    Diagnosis Date Noted POA    PRINCIPAL PROBLEM:  Encounter for vaginal delivery [O80] 2022 Not Applicable    38 weeks gestation of pregnancy [Z3A.38] 2022 Not Applicable    Single liveborn infant delivered vaginally [Z38.00] 2022 No      Problems Resolved During this Admission:    Diagnosis Date Noted Date Resolved POA    Pelvic pain affecting pregnancy in third trimester, antepartum [O26.893, R10.2] 2022 Unknown       Iron supplement BID.  Routine postpartum care.      Disposition: As patient meets milestones, will plan to discharge tomorrow.    Mana Andrea CNM  Obstetrics  [facility] -  Labor and Delivery

## 2022-12-02 NOTE — SUBJECTIVE & OBJECTIVE
Obstetric HPI:  Patient reports increasing frequancy and intensity of contractions, active fetal movement, No vaginal bleeding , No loss of fluid     This pregnancy has been complicated by hyperemesis    OB History    Para Term  AB Living   4 3 3 0 0 3   SAB IAB Ectopic Multiple Live Births   0 0 0 0 3      # Outcome Date GA Lbr [place]/2nd Weight Sex Delivery Anes PTL Lv   4 Current            3 Term 19   2.92 kg (6 lb 7 oz) F Vag-Spont  N SARA      Name: Ryley   2 Term 13   2.977 kg (6 lb 9 oz) F Vag-Spont  N SARA      Name: Elizabeth   1 Term 08   2.778 kg (6 lb 2 oz) M Vag-Spont  N SARA      Name: Tho     Past Medical History:   Diagnosis Date    Anemia, unspecified     Hyperemesis     Sickle cell trait      No past surgical history on file.    PTA Medications   Medication Sig    NIFEdipine (PROCARDIA) 10 MG Cap TAKE ONE CAPSULE BY MOUTH EVERY 6 HOURS    omeprazole (PRILOSEC OTC) 20 MG tablet Take 2 tablets (40 mg total) by mouth once daily.    ondansetron (ZOFRAN-ODT) 8 MG TbDL PLACE ONE TABLET ON THE top of TONGUE AND let DISSOLVE THEN swallow EVERY 6 HOURS AS NEEDED FOR NAUSEA    prenat.vits,carlee,min-iron-folic (PRENATAL VITAMIN) Tab     pyridoxine, vitamin B6, (B-6) 100 MG Tab 1 tablet.    XULANE 150-35 mcg/24 hr PLACE ONE PATCH onto SKIN ONCE a week (Patient not taking: Reported on 2022)       Review of patient's allergies indicates:  No Known Allergies     Family History       Problem Relation (Age of Onset)    Diabetes Mother    Miscarriages / Stillbirths Mother          Tobacco Use    Smoking status: Never     Passive exposure: Current    Smokeless tobacco: Never   Substance and Sexual Activity    Alcohol use: Never    Drug use: Never    Sexual activity: Yes     Partners: Male     Birth control/protection: None     Review of Systems   Constitutional:  Negative for chills and fever.   Respiratory:  Negative for cough and shortness of breath.    Cardiovascular:  Negative for  chest pain and leg swelling.   Gastrointestinal:  Positive for abdominal pain (contractions). Negative for blood in stool, constipation, diarrhea, nausea and vomiting.   Genitourinary:  Negative for dysuria, flank pain, frequency, hematuria, pelvic pain, urgency, vaginal bleeding, vaginal discharge, vaginal pain and vaginal odor.   Musculoskeletal:  Negative for back pain.   Neurological:  Negative for syncope and headaches.   Psychiatric/Behavioral:  Negative for depression. The patient is not nervous/anxious.     Objective:     Vital Signs (Most Recent):  Temp: 98.6 °F (37 °C) (12/01/22 1258)  Pulse: 79 (12/01/22 1736)  Resp: (!) 22 (12/01/22 1257)  BP: 139/71 (12/01/22 1736)  SpO2: 99 % (12/01/22 1257)   Vital Signs (24h Range):  Temp:  [98.6 °F (37 °C)] 98.6 °F (37 °C)  Pulse:  [76-83] 79  Resp:  [22] 22  SpO2:  [99 %] 99 %  BP: (123-143)/(71-95) 139/71     Weight: 74.1 kg (163 lb 6.4 oz)  Body mass index is 28.05 kg/m².    FHT: 125 Cat 1 (reassuring)  TOCO:  Q 2 minutes    Physical Exam:   Constitutional: She is oriented to person, place, and time. She appears well-developed and well-nourished.    HENT:   Head: Normocephalic and atraumatic.    Eyes: Pupils are equal, round, and reactive to light.     Cardiovascular:  Normal rate.      Exam reveals no edema.        Pulmonary/Chest: Effort normal. No respiratory distress.        Abdominal: Soft. She exhibits no distension and no mass. There is no abdominal tenderness. There is no rebound and no guarding.     Gravid. Uterus nontender     Genitourinary:    Pelvic exam was performed with patient supine.   The external female genitalia was normal.   No external genitalia lesions identified,Genitalia hair distrobution normal .             Musculoskeletal: Normal range of motion.       Neurological: She is alert and oriented to person, place, and time. She has normal reflexes. She displays normal reflexes. She exhibits normal muscle tone.    Skin: Skin is warm and dry.  No rash noted. No erythema.    Psychiatric: She has a normal mood and affect. Her behavior is normal.     Cervix:  Dilation:  4  Effacement:  80  Station: -1  Presentation: Vertex     Significant Labs:  No results found for: GROUPTRH, HEPBSAG, RUBELLAIGGSC, STREPBCULT, AFP, [id number]    I have personallly reviewed all pertinent lab results from the last 24 hours.

## 2022-12-02 NOTE — H&P
[facility] -  Labor and Delivery  Obstetrics  History & Physical    Patient Name: Glenys Modi  MRN: [id number]  Admission Date: 2022  Primary Care Provider: Rachel Lemos CNM (Inactive)    Subjective:     Principal Problem:Pelvic pain affecting pregnancy in third trimester, antepartum    History of Present Illness:    22  Pt of KAYLIN Andrea CNM    31yo  at 38w0d coming with contractions.    Was checked on Monday and was 2cm.    On arrival 3/70/-1    Over approx 3.5-4hrs  progressed to /-1  Admitted in labor      Denies vaginal bleeding, leakage of fluid  Reports good fetal movement    GBS negative              Obstetric HPI:  Patient reports increasing frequancy and intensity of contractions, active fetal movement, No vaginal bleeding , No loss of fluid     This pregnancy has been complicated by hyperemesis    OB History    Para Term  AB Living   4 3 3 0 0 3   SAB IAB Ectopic Multiple Live Births   0 0 0 0 3      # Outcome Date GA Lbr [place]/2nd Weight Sex Delivery Anes PTL Lv   4 Current            3 Term 19   2.92 kg (6 lb 7 oz) F Vag-Spont  N SARA      Name: Ryley   2 Term 13   2.977 kg (6 lb 9 oz) F Vag-Spont  N SARA      Name: Elizabeth   1 Term 08   2.778 kg (6 lb 2 oz) M Vag-Spont  N SARA      Name: Keeganrianabela     Past Medical History:   Diagnosis Date    Anemia, unspecified     Hyperemesis     Sickle cell trait      No past surgical history on file.    PTA Medications   Medication Sig    NIFEdipine (PROCARDIA) 10 MG Cap TAKE ONE CAPSULE BY MOUTH EVERY 6 HOURS    omeprazole (PRILOSEC OTC) 20 MG tablet Take 2 tablets (40 mg total) by mouth once daily.    ondansetron (ZOFRAN-ODT) 8 MG TbDL PLACE ONE TABLET ON THE top of TONGUE AND let DISSOLVE THEN swallow EVERY 6 HOURS AS NEEDED FOR NAUSEA    prenat.vits,carlee,min-iron-folic (PRENATAL VITAMIN) Tab     pyridoxine, vitamin B6, (B-6) 100 MG Tab 1 tablet.    XULANE 150-35 mcg/24 hr PLACE ONE PATCH onto  SKIN ONCE a week (Patient not taking: Reported on 9/21/2022)       Review of patient's allergies indicates:  No Known Allergies     Family History       Problem Relation (Age of Onset)    Diabetes Mother    Miscarriages / Stillbirths Mother          Tobacco Use    Smoking status: Never     Passive exposure: Current    Smokeless tobacco: Never   Substance and Sexual Activity    Alcohol use: Never    Drug use: Never    Sexual activity: Yes     Partners: Male     Birth control/protection: None     Review of Systems   Constitutional:  Negative for chills and fever.   Respiratory:  Negative for cough and shortness of breath.    Cardiovascular:  Negative for chest pain and leg swelling.   Gastrointestinal:  Positive for abdominal pain (contractions). Negative for blood in stool, constipation, diarrhea, nausea and vomiting.   Genitourinary:  Negative for dysuria, flank pain, frequency, hematuria, pelvic pain, urgency, vaginal bleeding, vaginal discharge, vaginal pain and vaginal odor.   Musculoskeletal:  Negative for back pain.   Neurological:  Negative for syncope and headaches.   Psychiatric/Behavioral:  Negative for depression. The patient is not nervous/anxious.     Objective:     Vital Signs (Most Recent):  Temp: 98.6 °F (37 °C) (12/01/22 1258)  Pulse: 79 (12/01/22 1736)  Resp: (!) 22 (12/01/22 1257)  BP: 139/71 (12/01/22 1736)  SpO2: 99 % (12/01/22 1257)   Vital Signs (24h Range):  Temp:  [98.6 °F (37 °C)] 98.6 °F (37 °C)  Pulse:  [76-83] 79  Resp:  [22] 22  SpO2:  [99 %] 99 %  BP: (123-143)/(71-95) 139/71     Weight: 74.1 kg (163 lb 6.4 oz)  Body mass index is 28.05 kg/m².    FHT: 125 Cat 1 (reassuring)  TOCO:  Q 2 minutes    Physical Exam:   Constitutional: She is oriented to person, place, and time. She appears well-developed and well-nourished.    HENT:   Head: Normocephalic and atraumatic.    Eyes: Pupils are equal, round, and reactive to light.     Cardiovascular:  Normal rate.      Exam reveals no edema.         Pulmonary/Chest: Effort normal. No respiratory distress.        Abdominal: Soft. She exhibits no distension and no mass. There is no abdominal tenderness. There is no rebound and no guarding.     Gravid. Uterus nontender     Genitourinary:    Pelvic exam was performed with patient supine.   The external female genitalia was normal.   No external genitalia lesions identified,Genitalia hair distrobution normal .             Musculoskeletal: Normal range of motion.       Neurological: She is alert and oriented to person, place, and time. She has normal reflexes. She displays normal reflexes. She exhibits normal muscle tone.    Skin: Skin is warm and dry. No rash noted. No erythema.    Psychiatric: She has a normal mood and affect. Her behavior is normal.     Cervix:  Dilation:  4  Effacement:  80  Station: -1  Presentation: Vertex     Significant Labs:  No results found for: GROUPTRH, HEPBSAG, RUBELLAIGGSC, STREPBCULT, AFP, NPTKLJA4IN    I have personallly reviewed all pertinent lab results from the last 24 hours.    Assessment/Plan:     32 y.o. female  at 38w0d for:    * Pelvic pain affecting pregnancy in third trimester, antepartum    Admitted in labor    KAYLIN Andrea, notified    38 weeks gestation of pregnancy        Admitted in labor  Expect     GBS    Desires epidural    Desires BTL      Twin Vicente MD  Obstetrics  [facility] -  Labor and Delivery

## 2022-12-02 NOTE — ANESTHESIA POSTPROCEDURE EVALUATION
Anesthesia Post Evaluation    Patient: Glenys Modi    Procedure(s) Performed: * No procedures listed *    Final Anesthesia Type: epidural      Patient location during evaluation: labor & delivery  Patient participation: Yes- Able to Participate  Level of consciousness: awake and sedated  Post-procedure vital signs: reviewed and stable  Pain management: adequate  Airway patency: patent    PONV status at discharge: No PONV  Anesthetic complications: no      Cardiovascular status: blood pressure returned to baseline  Respiratory status: unassisted  Hydration status: euvolemic  Follow-up not needed.          Vitals Value Taken Time   /70 12/02/22 0416   Temp 36.8 °C (98.2 °F) 12/02/22 0416   Pulse 75 12/02/22 0416   Resp 18 12/02/22 0416   SpO2 99 % 12/01/22 1906         No case tracking events are documented in the log.      Pain/Oliva Score: Pain Rating Prior to Med Admin: 10 (12/1/2022  6:59 PM)

## 2022-12-02 NOTE — DISCHARGE SUMMARY
[facility] -  Labor and Delivery  Obstetrics  Discharge Summary      Patient Name: Glenys Modi  MRN: [id number]  Admission Date: 12/1/2022  Hospital Length of Stay: 1 days  Discharge Date and Time:  12/3/22  Attending Physician: Jonah Allen, *   Discharging Provider: Mana Andrea CNM  Primary Care Provider: Mana Andrea CNM    HPI: Pt is doing well with no distress.  Pain is well controlled with prn meds.  Voiding and passing gas.  Infant is bottlefeeding and doing well.  Expected d/c for mom and baby tomorrow.     * No surgery found *     Hospital Course: Vaginal Delivery 12/1/22, epidural anesthesia, no complications.     Consults (From admission, onward)          Status Ordering Provider     Inpatient consult to Anesthesiology  Once        Provider:  (Not yet assigned)    Acknowledged SHIN TRUJILLO            Final Active Diagnoses:    Diagnosis Date Noted POA    PRINCIPAL PROBLEM:  Encounter for vaginal delivery [O80] 12/01/2022 Not Applicable    Anemia affecting pregnancy in third trimester [O99.013] 12/02/2022 Yes    38 weeks gestation of pregnancy [Z3A.38] 12/01/2022 Not Applicable    Single liveborn infant delivered vaginally [Z38.00] 12/01/2022 No      Problems Resolved During this Admission:    Diagnosis Date Noted Date Resolved POA    Pelvic pain affecting pregnancy in third trimester, antepartum [O26.893, R10.2] 12/01/2022 12/02/2022 Unknown        Labs: CMP   Recent Labs   Lab 12/01/22  1741      K 3.5      CO2 22   GLU 77   BUN 5*   CREATININE 0.60   CALCIUM 9.0   PROT 6.9   ALBUMIN 2.7*   BILITOT 0.6   ALKPHOS 116*   AST 19   ALT 12*   ANIONGAP 16   , CBC   Recent Labs   Lab 12/01/22  1741 12/02/22  0340   WBC 11.67* 12.79*   HGB 7.9* 6.8*   HCT 26.1* 22.8*    180   , and All labs within the past 24 hours have been reviewed    Feeding Method: bottle    Immunizations       Date Immunization Status Dose Route/Site Given by    12/01/22 2100 MMR Incomplete 0.5  mL Subcutaneous/     12/02/22 0950 Tdap Deleted 0.5 mL Intramuscular/     12/02/22 0949 Tdap Given 0.5 mL Intramuscular/Right arm Osmany Mims LPN            Delivery:    Episiotomy: None   Lacerations: None   Repair suture: None   Repair # of packets:     Blood loss (ml):       Birth information:  YOB: 2022   Time of birth: 7:38 PM   Sex: male   Delivery type: Vaginal, Spontaneous   Gestational Age: 38w0d    Delivery Clinician:      Other providers:       Additional  information:  Forceps:    Vacuum:    Breech:    Observed anomalies      Living?:           APGARS  One minute Five minutes Ten minutes   Skin color:         Heart rate:         Grimace:         Muscle tone:         Breathing:         Totals: 8  9        Placenta: Delivered:       appearance  Pending Diagnostic Studies:       None            Discharged Condition: good    Disposition: Home or Self Care    Follow Up:   Follow-up Information       Mana Andrea CNM Follow up in 3 week(s).    Specialty: Obstetrics and Gynecology  Why: Appt scheduled Thursday, Nov 22 @ 1:00 pm  Contact information:  [address] [facility] [address]  [phone]                           Patient Instructions:      Diet Adult Regular     Pelvic Rest   Order Comments: 6 weeks     Notify your health care provider if you experience any of the following:  temperature >100.4     Notify your health care provider if you experience any of the following:  persistent nausea and vomiting or diarrhea     Notify your health care provider if you experience any of the following:  severe uncontrolled pain     Notify your health care provider if you experience any of the following:  difficulty breathing or increased cough     Notify your health care provider if you experience any of the following:  severe persistent headache     Notify your health care provider if you experience any of the following:  persistent dizziness, light-headedness, or visual disturbances      Notify your health care provider if you experience any of the following:  increased confusion or weakness     Activity as tolerated     Medications:  Current Discharge Medication List        START taking these medications    Details   acetaminophen-codeine 300-30mg (TYLENOL #3) 300-30 mg Tab Take 2 tablets by mouth every 6 (six) hours as needed (moderate to severe pain).  Qty: 20 tablet, Refills: 0    Comments: n/a       ibuprofen (ADVIL,MOTRIN) 800 MG tablet Take 1 tablet (800 mg total) by mouth every 6 (six) hours as needed (cramping).  Qty: 30 tablet, Refills: 3      iron-vitamin C 100-250 mg, ICAR-C, 100-250 mg Tab Take 1 tablet by mouth 2 (two) times a day.  Qty: 60 tablet, Refills: 3           CONTINUE these medications which have NOT CHANGED    Details   omeprazole (PRILOSEC OTC) 20 MG tablet Take 2 tablets (40 mg total) by mouth once daily.  Qty: 28 tablet, Refills: 5    Associated Diagnoses: Encounter for supervision of other normal pregnancy in third trimester      prenat.vits,carlee,min-iron-folic (PRENATAL VITAMIN) Tab            STOP taking these medications       NIFEdipine (PROCARDIA) 10 MG Cap Comments:   Reason for Stopping:         ondansetron (ZOFRAN-ODT) 8 MG TbDL Comments:   Reason for Stopping:         pyridoxine, vitamin B6, (B-6) 100 MG Tab Comments:   Reason for Stopping:         XULANE 150-35 mcg/24 hr Comments:   Reason for Stopping:               Mana Andrea CNM  Obstetrics  [facility] -  Labor and Delivery

## 2022-12-02 NOTE — ANESTHESIA PREPROCEDURE EVALUATION
12/01/2022  Glenys Modi is a 32 y.o., female.      Pre-op Assessment    I have reviewed the Patient Summary Reports.     I have reviewed the Nursing Notes. I have reviewed the NPO Status.   I have reviewed the Medications.     Review of Systems  Anesthesia Hx:  No problems with previous Anesthesia    Social:  Non-Smoker, No Alcohol Use    Hematology/Oncology:  Hematology Normal   Oncology Normal     EENT/Dental:EENT/Dental Normal   Cardiovascular:  Cardiovascular Normal     Pulmonary:  Pulmonary Normal    Renal/:  Renal/ Normal     Hepatic/GI:  Hepatic/GI Normal    Musculoskeletal:  Musculoskeletal Normal    OB/GYN/PEDS:  IUP, TERM, PIH   Neurological:  Neurology Normal    Endocrine:  Endocrine Normal    Dermatological:  Skin Normal    Psych:  Psychiatric Normal           Physical Exam  General: Well nourished    Airway:  Mallampati: II / II  Mouth Opening: Normal  TM Distance: > 6 cm  Tongue: Normal  Neck ROM: Normal ROM    Chest/Lungs:  Clear to auscultation, Normal Respiratory Rate    Heart:  Rate: Normal  Rhythm: Regular Rhythm        Anesthesia Plan  Type of Anesthesia, risks & benefits discussed:    Anesthesia Type: Epidural  Intra-op Monitoring Plan: Standard ASA Monitors  Post Op Pain Control Plan: epidural analgesia  Induction:  IV  Informed Consent: Informed consent signed with the Patient and all parties understand the risks and agree with anesthesia plan.  All questions answered.   ASA Score: 2  Day of Surgery Review of History & Physical: H&P Update referred to the surgeon/provider.I have interviewed and examined the patient. I have reviewed the patient's H&P dated: There are no significant changes. H&P completed by Anesthesiologist.    Ready For Surgery From Anesthesia Perspective.     .

## 2022-12-02 NOTE — L&D DELIVERY NOTE
"[facility] -  Labor and Delivery  Vaginal Delivery   Operative Note    SUMMARY   Pt progressed to complete and pushing with epidural anesthesia.   Normal spontaneous vaginal delivery of live male infant, was placed on mothers abdomen for skin to skin and bulb suctioning performed. Spontaneous cry and respirations. Infant delivered position BRENTON over intact perineum. Nuchal cord x1: Yes, cord reduced at perineum.  Delayed cord clamping done, cord blood collected.     Spontaneous delivery of placenta and IV pitocin given noting good uterine tone.  Perineum inspected. No lacerations noted.  Patient tolerated delivery well. Sponge needle and lap counted correctly x2.  Red downs cath inserted to empty bladder, 200mL urine returned.  EBL 150mL    Indications: Pelvic pain affecting pregnancy in third trimester, antepartum  Pregnancy complicated by:   Patient Active Problem List   Diagnosis    38 weeks gestation of pregnancy    Pelvic pain affecting pregnancy in third trimester, antepartum    Encounter for vaginal delivery    Single liveborn infant delivered vaginally     Admitting GA: 38w0d    Delivery Information for Shawn Modi    Birth information:  YOB: 2022   Time of birth: 7:38 PM   Sex: male   Head Delivery Date/Time: 12/1/2022  7:38 PM   Delivery type: Vaginal, Spontaneous   Gestational Age: 38w0d    Delivery Providers    Delivering clinician: Mana Andrea CNM           Measurements    Weight: 2730 g  Weight (lbs): 6 lb 0.3 oz  Length: 48.3 cm  Length (in): 19"         Apgars    Living status: Living  Apgars:  1 min.:  5 min.:  10 min.:  15 min.:  20 min.:    Skin color:  0  1       Heart rate:  2  2       Reflex irritability:  2  2       Muscle tone:  2  2       Respiratory effort:  2  2       Total:  8  9       Apgars assigned by: MARTI LIZ RN         Operative Delivery    Forceps attempted?: No  Vacuum extractor attempted?: No         Shoulder Dystocia    Shoulder dystocia " present?: No           Presentation    Presentation: Vertex  Position: Left Occiput Anterior           Interventions/Resuscitation    Method: Tactile Stimulation, Bulb Suctioning       Cord    Vessels: 3 vessels  Complications: Nuchal  Nuchal Intervention: reduced  Nuchal Cord Description: loose nuchal cord  Number of Loops: 1  Delayed Cord Clamping?: Yes  Cord Blood Disposition: Sent with Baby  Gases Sent?: No  Stem Cell Collection (by MD): No       Placenta    Placenta delivery date/time: 2022  Placenta removal: Spontaneous  Placenta appearance: Intact  Placenta disposition: discarded           Labor Events:       labor: No     Labor Onset Date/Time:         Dilation Complete Date/Time: 2022 19:02     Start Pushing Date/Time: 2022 19:06     Rupture Date/Time: 22  1753         Rupture type: SRM (Spontaneous Rupture)           Fluid Amount:         Fluid Color: Green, Meconium Thick         steroids: Unknown     Antibiotics given for GBS: No     Induction: none     Indications for induction:        Augmentation:       Indications for augmentation:       Labor complications: None     Additional complications:          Cervical ripening:                     Delivery:      Episiotomy: None     Indication for Episiotomy:       Perineal Lacerations: None Repaired:      Periurethral Laceration:   Repaired:     Labial Laceration:   Repaired:     Sulcus Laceration:   Repaired:     Vaginal Laceration:   Repaired:     Cervical Laceration:   Repaired:     Repair suture: None     Repair # of packets:       Last Value - EBL - Nursing (mL):       Sum - EBL - Nursing (mL): 0     Last Value - EBL - Anesthesia (mL):        Calculated QBL (mL):         Vaginal Sweep Performed: Yes     Surgicount Correct: Yes       Other providers:       Anesthesia    Method: Epidural          Details (if applicable):  Trial of Labor      Categorization:      Priority:      Indications for :     Incision Type:       Additional  information:  Forceps:    Vacuum:    Breech:    Observed anomalies    Other (Comments):

## 2022-12-02 NOTE — LACTATION NOTE
This note was copied from a baby's chart.  Breastfeeding rounds done, mom reports infant not latching well, mom encouraged to feed at least 8 times a day for as often as infant desired, mom to call for assistance with next feed

## 2022-12-02 NOTE — PLAN OF CARE
Preparing for discharge  Problem: Adult Inpatient Plan of Care  Goal: Plan of Care Review  Outcome: Ongoing, Progressing  Goal: Patient-Specific Goal (Individualized)  Outcome: Ongoing, Progressing  Goal: Absence of Hospital-Acquired Illness or Injury  Outcome: Ongoing, Progressing  Goal: Optimal Comfort and Wellbeing  Outcome: Ongoing, Progressing  Goal: Readiness for Transition of Care  Outcome: Ongoing, Progressing     Problem:  Fall Injury Risk  Goal: Absence of Fall, Infant Drop and Related Injury  Outcome: Ongoing, Progressing     Problem: Infection  Goal: Absence of Infection Signs and Symptoms  Outcome: Ongoing, Progressing     Problem: Adjustment to Role Transition (Postpartum Vaginal Delivery)  Goal: Successful Maternal Role Transition  Outcome: Ongoing, Progressing     Problem: Bleeding (Postpartum Vaginal Delivery)  Goal: Hemostasis  Outcome: Ongoing, Progressing     Problem: Infection (Postpartum Vaginal Delivery)  Goal: Absence of Infection Signs/Symptoms  Outcome: Ongoing, Progressing     Problem: Pain (Postpartum Vaginal Delivery)  Goal: Acceptable Pain Control  Outcome: Ongoing, Progressing     Problem: Urinary Retention (Postpartum Vaginal Delivery)  Goal: Effective Urinary Elimination  Outcome: Ongoing, Progressing   Preparing for discharge

## 2022-12-03 VITALS
OXYGEN SATURATION: 99 % | BODY MASS INDEX: 27.89 KG/M2 | WEIGHT: 163.38 LBS | RESPIRATION RATE: 16 BRPM | TEMPERATURE: 98 F | HEIGHT: 64 IN | HEART RATE: 72 BPM | SYSTOLIC BLOOD PRESSURE: 107 MMHG | DIASTOLIC BLOOD PRESSURE: 55 MMHG

## 2022-12-03 LAB — UA COMPLETE W REFLEX CULTURE PNL UR: NORMAL

## 2022-12-03 PROCEDURE — 25000003 PHARM REV CODE 250: Performed by: ADVANCED PRACTICE MIDWIFE

## 2022-12-03 RX ADMIN — IBUPROFEN 800 MG: 800 TABLET, FILM COATED ORAL at 07:12

## 2022-12-03 RX ADMIN — Medication 1 TABLET: at 08:12

## 2022-12-22 ENCOUNTER — TELEPHONE (OUTPATIENT)
Dept: OBSTETRICS AND GYNECOLOGY | Facility: CLINIC | Age: 32
End: 2022-12-22
Payer: MEDICAID

## 2022-12-22 ENCOUNTER — POSTPARTUM VISIT (OUTPATIENT)
Dept: OBSTETRICS AND GYNECOLOGY | Facility: CLINIC | Age: 32
End: 2022-12-22
Payer: MEDICAID

## 2022-12-22 VITALS
DIASTOLIC BLOOD PRESSURE: 78 MMHG | SYSTOLIC BLOOD PRESSURE: 102 MMHG | HEIGHT: 64 IN | BODY MASS INDEX: 25.1 KG/M2 | WEIGHT: 147 LBS

## 2022-12-22 PROCEDURE — 1111F PR DISCHARGE MEDS RECONCILED W/ CURRENT OUTPATIENT MED LIST: ICD-10-PCS | Mod: CPTII,,, | Performed by: ADVANCED PRACTICE MIDWIFE

## 2022-12-22 PROCEDURE — 1111F DSCHRG MED/CURRENT MED MERGE: CPT | Mod: CPTII,,, | Performed by: ADVANCED PRACTICE MIDWIFE

## 2022-12-22 PROCEDURE — 0503F PR POSTPARTUM CARE VISIT: ICD-10-PCS | Mod: S$PBB,,, | Performed by: ADVANCED PRACTICE MIDWIFE

## 2022-12-22 PROCEDURE — 99213 OFFICE O/P EST LOW 20 MIN: CPT | Mod: PBBFAC | Performed by: ADVANCED PRACTICE MIDWIFE

## 2022-12-22 PROCEDURE — 0503F POSTPARTUM CARE VISIT: CPT | Mod: S$PBB,,, | Performed by: ADVANCED PRACTICE MIDWIFE

## 2022-12-22 NOTE — TELEPHONE ENCOUNTER
----- Message from Mana Andrea CNM sent at 12/22/2022  1:38 PM CST -----  Regarding: Tubal Sterilization and Circumcision  Pt needs an appt with Dr Allen for Tubal consult.  Also wants to see about getting a circumcision appt.  Baby is 3 weeks old but only weighs 6# and Dr Allen said she would be okay doing it.

## 2022-12-22 NOTE — PROGRESS NOTES
"Subjective:       Glenys Modi is a 32 y.o. female who presents for a postpartum visit. She is 3 weeks postpartum following a spontaneous vaginal delivery. I have fully reviewed the prenatal and intrapartum course. The delivery was at 38 gestational weeks. Outcome: spontaneous vaginal delivery. Anesthesia: epidural. Postpartum course has been uncomplicated. Baby's course has been normal. Baby is feeding by both breast and bottle - . Bleeding thin lochia. Bowel function is normal. Bladder function is normal. Patient is not sexually active. Contraception method is abstinence. Postpartum depression screening: negative.    The following portions of the patient's history were reviewed and updated as appropriate: allergies, current medications, past family history, past medical history, past social history, past surgical history, and problem list.    Review of Systems  Pertinent items are noted in HPI.     Objective:      /78   Ht 5' 4" (1.626 m)   Wt 66.7 kg (147 lb)   LMP 03/10/2022 (Approximate)   Breastfeeding Yes   BMI 25.23 kg/m²    General:  alert, appears stated age, cooperative, and no distress        Lungs: clear to auscultation bilaterally   Heart:  regular rate and rhythm   Abdomen: soft, non-tender; bowel sounds normal; no masses,  no organomegaly       Extremities WNL, no edema   Skin W/D, pink                      Assessment:   Maternal Depression Screening   Normal 3wk postpartum exam.   Referral to Dr Allen for Tubal Sterilization    Plan:      1. Contraception: abstinence and plans tubal sterilization  2. Referral to Dr Allen  3. Follow up in: 3 weeks or as needed.     "

## 2022-12-27 ENCOUNTER — PATIENT MESSAGE (OUTPATIENT)
Dept: OBSTETRICS AND GYNECOLOGY | Facility: CLINIC | Age: 32
End: 2022-12-27
Payer: MEDICAID

## 2022-12-28 RX ORDER — METOCLOPRAMIDE 10 MG/1
10 TABLET ORAL
Qty: 90 TABLET | Refills: 5 | OUTPATIENT
Start: 2022-12-28 | End: 2023-02-05

## 2023-01-09 ENCOUNTER — OFFICE VISIT (OUTPATIENT)
Dept: OBSTETRICS AND GYNECOLOGY | Facility: CLINIC | Age: 33
End: 2023-01-09
Payer: MEDICAID

## 2023-01-09 VITALS
BODY MASS INDEX: 25.27 KG/M2 | HEIGHT: 64 IN | DIASTOLIC BLOOD PRESSURE: 80 MMHG | RESPIRATION RATE: 17 BRPM | SYSTOLIC BLOOD PRESSURE: 110 MMHG | WEIGHT: 148 LBS

## 2023-01-09 DIAGNOSIS — Z30.013 ENCOUNTER FOR PRESCRIPTION FOR DEPO-PROVERA: ICD-10-CM

## 2023-01-09 DIAGNOSIS — N93.9 ABNORMAL UTERINE BLEEDING: ICD-10-CM

## 2023-01-09 DIAGNOSIS — Z30.430 ENCOUNTER FOR IUD INSERTION: Primary | ICD-10-CM

## 2023-01-09 LAB
B-HCG UR QL: NEGATIVE
CTP QC/QA: YES

## 2023-01-09 PROCEDURE — 96372 THER/PROPH/DIAG INJ SC/IM: CPT | Mod: PBBFAC | Performed by: STUDENT IN AN ORGANIZED HEALTH CARE EDUCATION/TRAINING PROGRAM

## 2023-01-09 PROCEDURE — 3079F DIAST BP 80-89 MM HG: CPT | Mod: CPTII,,, | Performed by: STUDENT IN AN ORGANIZED HEALTH CARE EDUCATION/TRAINING PROGRAM

## 2023-01-09 PROCEDURE — 3074F SYST BP LT 130 MM HG: CPT | Mod: CPTII,,, | Performed by: STUDENT IN AN ORGANIZED HEALTH CARE EDUCATION/TRAINING PROGRAM

## 2023-01-09 PROCEDURE — 1159F PR MEDICATION LIST DOCUMENTED IN MEDICAL RECORD: ICD-10-PCS | Mod: CPTII,,, | Performed by: STUDENT IN AN ORGANIZED HEALTH CARE EDUCATION/TRAINING PROGRAM

## 2023-01-09 PROCEDURE — 81025 URINE PREGNANCY TEST: CPT | Mod: PBBFAC | Performed by: STUDENT IN AN ORGANIZED HEALTH CARE EDUCATION/TRAINING PROGRAM

## 2023-01-09 PROCEDURE — 1159F MED LIST DOCD IN RCRD: CPT | Mod: CPTII,,, | Performed by: STUDENT IN AN ORGANIZED HEALTH CARE EDUCATION/TRAINING PROGRAM

## 2023-01-09 PROCEDURE — 3008F PR BODY MASS INDEX (BMI) DOCUMENTED: ICD-10-PCS | Mod: CPTII,,, | Performed by: STUDENT IN AN ORGANIZED HEALTH CARE EDUCATION/TRAINING PROGRAM

## 2023-01-09 PROCEDURE — 3074F PR MOST RECENT SYSTOLIC BLOOD PRESSURE < 130 MM HG: ICD-10-PCS | Mod: CPTII,,, | Performed by: STUDENT IN AN ORGANIZED HEALTH CARE EDUCATION/TRAINING PROGRAM

## 2023-01-09 PROCEDURE — 3008F BODY MASS INDEX DOCD: CPT | Mod: CPTII,,, | Performed by: STUDENT IN AN ORGANIZED HEALTH CARE EDUCATION/TRAINING PROGRAM

## 2023-01-09 PROCEDURE — 3079F PR MOST RECENT DIASTOLIC BLOOD PRESSURE 80-89 MM HG: ICD-10-PCS | Mod: CPTII,,, | Performed by: STUDENT IN AN ORGANIZED HEALTH CARE EDUCATION/TRAINING PROGRAM

## 2023-01-09 PROCEDURE — 99213 PR OFFICE/OUTPT VISIT, EST, LEVL III, 20-29 MIN: ICD-10-PCS | Mod: S$PBB,,, | Performed by: STUDENT IN AN ORGANIZED HEALTH CARE EDUCATION/TRAINING PROGRAM

## 2023-01-09 PROCEDURE — 99213 OFFICE O/P EST LOW 20 MIN: CPT | Mod: PBBFAC | Performed by: STUDENT IN AN ORGANIZED HEALTH CARE EDUCATION/TRAINING PROGRAM

## 2023-01-09 PROCEDURE — 99213 OFFICE O/P EST LOW 20 MIN: CPT | Mod: S$PBB,,, | Performed by: STUDENT IN AN ORGANIZED HEALTH CARE EDUCATION/TRAINING PROGRAM

## 2023-01-09 RX ORDER — MEDROXYPROGESTERONE ACETATE 150 MG/ML
150 INJECTION, SUSPENSION INTRAMUSCULAR
Status: COMPLETED | OUTPATIENT
Start: 2023-01-09 | End: 2023-01-09

## 2023-01-09 RX ADMIN — MEDROXYPROGESTERONE ACETATE 150 MG: 150 INJECTION, SUSPENSION INTRAMUSCULAR at 11:01

## 2023-01-09 NOTE — PROGRESS NOTES
Gynecology History and Physical    Assessment/Plan:   Problem List Items Addressed This Visit          Renal/    Abnormal uterine bleeding    Overview     Reports heavy cycles  No interest in future fertility  Depo of depo today  Considering tubal ligation with endometrial ablation vs Mirena IUD  Desires to proceed with Mirena IUD insertion          Other Visit Diagnoses       Encounter for IUD insertion    -  Primary    Relevant Orders    Prior authorization Order    Encounter for prescription for depo-Provera        Relevant Orders    POCT urine pregnancy (Completed)              CC:   Chief Complaint   Patient presents with    Consult     Pt wants to consult for tubal ligation        HPI: Glenys Modi is a 32 y.o. female who presents with complaints of heavy cycles.  Wants to discuss birth control options and management for cycles.      Review of Systems: The following ROS was otherwise negative, except as noted in the HPI:  constitutional, HEENT, respiratory, cardiovascular, gastrointestinal, genitourinary, skin, musculoskeletal, neurological, psych    Gynecologic History:     Menstrual history: heavy cycles with clots       Obstetrical History:  OB History          4    Para   4    Term   4       0    AB   0    Living   4         SAB        IAB        Ectopic        Multiple   0    Live Births   4                 Past Medical History:   Past Medical History:   Diagnosis Date    Anemia, unspecified     Hyperemesis     Sickle cell trait        Medications:  Medication List with Changes/Refills   Current Medications    IBUPROFEN (ADVIL,MOTRIN) 800 MG TABLET    Take 1 tablet (800 mg total) by mouth every 6 (six) hours as needed (cramping).    IRON-VITAMIN C 100-250 MG, ICAR-C, 100-250 MG TAB    Take 1 tablet by mouth 2 (two) times a day.    METOCLOPRAMIDE HCL (REGLAN) 10 MG TABLET    Take 1 tablet (10 mg total) by mouth 3 (three) times daily with meals.    OMEPRAZOLE (PRILOSEC OTC) 20 MG TABLET     "Take 2 tablets (40 mg total) by mouth once daily.    PRENAT.VITS,AN,MIN-IRON-FOLIC (PRENATAL VITAMIN) TAB           Allergies:  Patient has no known allergies.    Surgical History:  History reviewed. No pertinent surgical history.    Family History:  Family History   Problem Relation Age of Onset    Miscarriages / Stillbirths Mother     Diabetes Mother        Social History:  Social History     Substance and Sexual Activity   Alcohol Use Never     Social History     Substance and Sexual Activity   Drug Use Never     Social History     Tobacco Use   Smoking Status Never    Passive exposure: Current   Smokeless Tobacco Never       Physical Exam:  /80 (BP Location: Left arm, Patient Position: Sitting)   Resp 17   Ht 5' 4" (1.626 m)   Wt 67.1 kg (148 lb)   LMP 12/19/2022 (Approximate)   Breastfeeding No   BMI 25.40 kg/m²     General: Alert, well appearing, no acute distress  Head: Normocephalic, atraumatic  Lungs: Unlabored respirations  Extremities: No redness or tenderness  Skin: Well perfused, normal coloration and turgor, no lesions or rashes visualized  Neuro: Alert, oriented, normal speech, no focal deficits, moves extremities appropriately  Osteopathic: No TART changes    Labs:  Office Visit on 01/09/2023   Component Date Value    POC Preg Test, Ur 01/09/2023 Negative      Acceptab* 01/09/2023 Yes         "

## 2023-01-17 PROBLEM — N93.9 ABNORMAL UTERINE BLEEDING: Status: ACTIVE | Noted: 2023-01-17

## 2023-01-31 ENCOUNTER — PROCEDURE VISIT (OUTPATIENT)
Dept: OBSTETRICS AND GYNECOLOGY | Facility: CLINIC | Age: 33
End: 2023-01-31
Payer: MEDICAID

## 2023-01-31 VITALS
TEMPERATURE: 98 F | HEART RATE: 80 BPM | WEIGHT: 149 LBS | SYSTOLIC BLOOD PRESSURE: 116 MMHG | DIASTOLIC BLOOD PRESSURE: 80 MMHG | RESPIRATION RATE: 18 BRPM | HEIGHT: 64 IN | BODY MASS INDEX: 25.44 KG/M2 | OXYGEN SATURATION: 99 %

## 2023-01-31 DIAGNOSIS — Z01.419 PAP TEST, AS PART OF ROUTINE GYNECOLOGICAL EXAMINATION: ICD-10-CM

## 2023-01-31 DIAGNOSIS — Z30.430 ENCOUNTER FOR IUD INSERTION: Primary | ICD-10-CM

## 2023-01-31 PROCEDURE — 0503F PR POSTPARTUM CARE VISIT: ICD-10-PCS | Mod: CPTII,,, | Performed by: ADVANCED PRACTICE MIDWIFE

## 2023-01-31 PROCEDURE — 87624 HPV HI-RISK TYP POOLED RSLT: CPT | Mod: ,,, | Performed by: CLINICAL MEDICAL LABORATORY

## 2023-01-31 PROCEDURE — 58300 INSERT INTRAUTERINE DEVICE: CPT | Mod: PBBFAC | Performed by: ADVANCED PRACTICE MIDWIFE

## 2023-01-31 PROCEDURE — 87624 HUMAN PAPILLOMAVIRUS (HPV): ICD-10-PCS | Mod: ,,, | Performed by: CLINICAL MEDICAL LABORATORY

## 2023-01-31 PROCEDURE — 88142 CYTOPATH C/V THIN LAYER: CPT | Mod: TC,GCY | Performed by: ADVANCED PRACTICE MIDWIFE

## 2023-01-31 PROCEDURE — 58300 INSERTION OF IUD-TODAY: ICD-10-PCS | Mod: S$PBB,,, | Performed by: ADVANCED PRACTICE MIDWIFE

## 2023-01-31 PROCEDURE — 0503F POSTPARTUM CARE VISIT: CPT | Mod: CPTII,,, | Performed by: ADVANCED PRACTICE MIDWIFE

## 2023-01-31 RX ADMIN — LEVONORGESTREL 1 EACH: 52 INTRAUTERINE DEVICE INTRAUTERINE at 02:01

## 2023-02-03 LAB
GH SERPL-MCNC: NORMAL NG/ML
INSULIN SERPL-ACNC: NORMAL U[IU]/ML
LAB AP CLINICAL INFORMATION: NORMAL
LAB AP GYN INTERPRETATION: NEGATIVE
LAB AP PAP DISCLAIMER COMMENTS: NORMAL
RENIN PLAS-CCNC: NORMAL NG/ML/H

## 2023-02-05 ENCOUNTER — HOSPITAL ENCOUNTER (EMERGENCY)
Facility: HOSPITAL | Age: 33
Discharge: HOME | End: 2023-02-05
Attending: FAMILY MEDICINE
Payer: MEDICAID

## 2023-02-05 VITALS
DIASTOLIC BLOOD PRESSURE: 80 MMHG | TEMPERATURE: 99 F | HEART RATE: 102 BPM | RESPIRATION RATE: 16 BRPM | BODY MASS INDEX: 25.1 KG/M2 | SYSTOLIC BLOOD PRESSURE: 134 MMHG | WEIGHT: 147 LBS | HEIGHT: 64 IN | OXYGEN SATURATION: 98 %

## 2023-02-05 DIAGNOSIS — R09.81 NASAL CONGESTION: Primary | ICD-10-CM

## 2023-02-05 PROCEDURE — 99283 EMERGENCY DEPT VISIT LOW MDM: CPT

## 2023-02-05 PROCEDURE — 99283 PR EMERGENCY DEPT VISIT,LEVEL III: ICD-10-PCS | Mod: ,,, | Performed by: FAMILY MEDICINE

## 2023-02-05 PROCEDURE — 99283 EMERGENCY DEPT VISIT LOW MDM: CPT | Mod: ,,, | Performed by: FAMILY MEDICINE

## 2023-02-05 RX ORDER — METHYLPREDNISOLONE 4 MG/1
TABLET ORAL
Qty: 21 EACH | Refills: 0 | Status: SHIPPED | OUTPATIENT
Start: 2023-02-05 | End: 2023-02-26

## 2023-02-05 NOTE — ED NOTES
"Patient in ER Bed #2 complaining of "Sinus" Denies cough, fever, or sinus drainage. Positive for "tickle" in throat.  "

## 2023-02-05 NOTE — ED PROVIDER NOTES
Encounter Date: 2/5/2023       History     Chief Complaint   Patient presents with    Sinusitis     PT HERE WITH SINUS CONGESTION. HAS NOT USED ANTIHISTAMINE. HAS USED SOME RAY SELTZER.     The history is provided by the patient.   Review of patient's allergies indicates:  No Known Allergies  Past Medical History:   Diagnosis Date    Anemia, unspecified     Hyperemesis     Sickle cell trait      History reviewed. No pertinent surgical history.  Family History   Problem Relation Age of Onset    Miscarriages / Stillbirths Mother     Diabetes Mother      Social History     Tobacco Use    Smoking status: Never     Passive exposure: Current    Smokeless tobacco: Never   Substance Use Topics    Alcohol use: Never    Drug use: Never     Review of Systems   Constitutional:  Negative for activity change and appetite change.   HENT:  Positive for congestion.    Respiratory:  Positive for cough.      Physical Exam     Initial Vitals [02/05/23 0832]   BP Pulse Resp Temp SpO2   134/80 102 16 98.5 °F (36.9 °C) 98 %      MAP       --         Physical Exam    Constitutional: She appears well-developed and well-nourished.   HENT:   Head: Normocephalic and atraumatic.   TM DULL LEFT EAR   Eyes: EOM are normal. Pupils are equal, round, and reactive to light.   Neck: Neck supple.   Normal range of motion.  Cardiovascular:  Normal rate and regular rhythm.           Pulmonary/Chest: Breath sounds normal.   Abdominal: Abdomen is soft.   Musculoskeletal:         General: Normal range of motion.      Cervical back: Normal range of motion and neck supple.     Neurological: She is alert and oriented to person, place, and time.   Skin: Skin is warm.   Psychiatric: She has a normal mood and affect. Her behavior is normal. Thought content normal.       Medical Screening Exam   See Full Note    ED Course   Procedures  Labs Reviewed - No data to display       Imaging Results    None          Medications - No data to display  Medical Decision  Making:   ED Management:  NASAL CONGESTION  MEDROL DOSE PARRIS  F/U WITH PCP                 Clinical Impression:   Final diagnoses:  [R09.81] Nasal congestion (Primary)        ED Disposition Condition    Discharge Stable          ED Prescriptions       Medication Sig Dispense Start Date End Date Auth. Provider    methylPREDNISolone (MEDROL DOSEPACK) 4 mg tablet use as directed 21 each 2/5/2023 2/26/2023 Tiffanie Jaquez MD          Follow-up Information    None          Tiffanie Jaquez MD  02/05/23 7645       Tiffanie Jaquez MD  02/14/23 9085

## 2023-02-09 LAB
HPV 16: NEGATIVE
HPV 18: NEGATIVE
HPV OTHER: POSITIVE

## 2023-02-27 NOTE — PROGRESS NOTES
Subjective:       Patient ID: Glenys Modi is a 32 y.o. female.    Chief Complaint: Contraception (In for mirena insertion. Stated that she had SA in the last 2 weeks and they didn't use protection.. Pt did get depo-provera on 01/09/2023.)    In for Mirena IUD insertion and pap collection.   Rec'd Depo prior to discharge from hospital after vaginal delivery  Pt is 6 weeks postpartum  Denies complaints, infant doing well, bottlefeeding.  No vaginal bleeding.  Denies s/s depression      Review of Systems   Constitutional: Negative.    HENT: Negative.     Eyes: Negative.    Respiratory: Negative.     Cardiovascular: Negative.    Gastrointestinal: Negative.    Endocrine: Negative.    Genitourinary: Negative.    Musculoskeletal: Negative.    Integumentary:  Negative.   Allergic/Immunologic: Negative.    Neurological: Negative.    Psychiatric/Behavioral: Negative.         Objective:      Physical Exam  Vitals reviewed.   Constitutional:       Appearance: Normal appearance.   HENT:      Head: Normocephalic.   Cardiovascular:      Rate and Rhythm: Normal rate and regular rhythm.   Pulmonary:      Effort: Pulmonary effort is normal.      Breath sounds: Normal breath sounds.   Chest:   Breasts:     Right: Normal. No mass.      Left: Normal. No mass.   Abdominal:      General: Abdomen is flat.      Palpations: Abdomen is soft.   Genitourinary:     General: Normal vulva.      Exam position: Lithotomy position.      Vagina: Normal.      Cervix: No cervical motion tenderness.      Uterus: Normal. Not tender.       Adnexa: Right adnexa normal and left adnexa normal.        Right: No mass.          Left: No mass.     Musculoskeletal:         General: Normal range of motion.      Cervical back: Normal range of motion.   Skin:     General: Skin is warm and dry.   Neurological:      Mental Status: She is alert and oriented to person, place, and time.   Psychiatric:         Mood and Affect: Mood normal.         Behavior: Behavior  normal.       Assessment:       Problem List Items Addressed This Visit    None  Visit Diagnoses       Encounter for IUD insertion    -  Primary    Relevant Orders    Insertion of IUD-Today (Completed)    Pap test, as part of routine gynecological examination        Relevant Orders    ThinPrep Pap Test (Completed)    HPV DNA probe, amplified (Completed)              Plan:       See procedure note for IUD insertion  F/U for any problems or as needed.

## 2023-02-27 NOTE — PROCEDURES
Insertion of IUD-Today    Date/Time: 1/31/2023 2:15 PM  Performed by: Mana Andrea CNM  Authorized by: Mana Andrea CNM     Consent:     Consent obtained:  Verbal and written    Consent given by:  Patient    Procedure risks and benefits discussed: yes      Patient questions answered: yes      Patient agrees, verbalizes understanding, and wants to proceed: yes      Instructions and paperwork completed: yes    Procedure:     Pelvic exam performed: yes      Negative urine pregnancy test: yes      Cervix cleaned and prepped: yes      Speculum placed in vagina: yes      Uterus sounded: yes      Uterus sound depth (cm):  9    IUD inserted with no complications: yes      Strings trimmed: yes    1 each Mirena IUD     Post-procedure:     Patient tolerated procedure well: yes    Comments:      Ibuprofen 800mg every 6 hours x 48 hours  Discussed s/s complications to report  F/U for string check in 6 weeks

## 2023-03-01 ENCOUNTER — PATIENT MESSAGE (OUTPATIENT)
Dept: OBSTETRICS AND GYNECOLOGY | Facility: CLINIC | Age: 33
End: 2023-03-01
Payer: MEDICAID

## 2023-03-02 ENCOUNTER — TELEPHONE (OUTPATIENT)
Dept: OBSTETRICS AND GYNECOLOGY | Facility: CLINIC | Age: 33
End: 2023-03-02
Payer: MEDICAID

## 2023-03-02 NOTE — TELEPHONE ENCOUNTER
Call to pt re:  Pap Smear results / recommendation;  Pap Smear results have returned normal but HPV results are positive.  Per Mana Andrea CNM, advised patient will continue to monitor at routine yearly Pap Smear visits.  Patient voiced agreement and understanding.

## 2023-03-20 ENCOUNTER — OFFICE VISIT (OUTPATIENT)
Dept: OBSTETRICS AND GYNECOLOGY | Facility: CLINIC | Age: 33
End: 2023-03-20
Payer: MEDICAID

## 2023-03-20 VITALS
SYSTOLIC BLOOD PRESSURE: 102 MMHG | HEART RATE: 77 BPM | DIASTOLIC BLOOD PRESSURE: 70 MMHG | BODY MASS INDEX: 27.14 KG/M2 | RESPIRATION RATE: 19 BRPM | WEIGHT: 159 LBS | HEIGHT: 64 IN | TEMPERATURE: 99 F | OXYGEN SATURATION: 100 %

## 2023-03-20 DIAGNOSIS — Z30.431 ENCOUNTER FOR ROUTINE CHECKING OF INTRAUTERINE CONTRACEPTIVE DEVICE (IUD): Primary | ICD-10-CM

## 2023-03-20 PROCEDURE — 3008F PR BODY MASS INDEX (BMI) DOCUMENTED: ICD-10-PCS | Mod: CPTII,,, | Performed by: ADVANCED PRACTICE MIDWIFE

## 2023-03-20 PROCEDURE — 99214 OFFICE O/P EST MOD 30 MIN: CPT | Mod: PBBFAC | Performed by: ADVANCED PRACTICE MIDWIFE

## 2023-03-20 PROCEDURE — 1159F MED LIST DOCD IN RCRD: CPT | Mod: CPTII,,, | Performed by: ADVANCED PRACTICE MIDWIFE

## 2023-03-20 PROCEDURE — 99212 PR OFFICE/OUTPT VISIT, EST, LEVL II, 10-19 MIN: ICD-10-PCS | Mod: S$PBB,,, | Performed by: ADVANCED PRACTICE MIDWIFE

## 2023-03-20 PROCEDURE — 3008F BODY MASS INDEX DOCD: CPT | Mod: CPTII,,, | Performed by: ADVANCED PRACTICE MIDWIFE

## 2023-03-20 PROCEDURE — 3078F DIAST BP <80 MM HG: CPT | Mod: CPTII,,, | Performed by: ADVANCED PRACTICE MIDWIFE

## 2023-03-20 PROCEDURE — 1159F PR MEDICATION LIST DOCUMENTED IN MEDICAL RECORD: ICD-10-PCS | Mod: CPTII,,, | Performed by: ADVANCED PRACTICE MIDWIFE

## 2023-03-20 PROCEDURE — 99212 OFFICE O/P EST SF 10 MIN: CPT | Mod: S$PBB,,, | Performed by: ADVANCED PRACTICE MIDWIFE

## 2023-03-20 PROCEDURE — 3078F PR MOST RECENT DIASTOLIC BLOOD PRESSURE < 80 MM HG: ICD-10-PCS | Mod: CPTII,,, | Performed by: ADVANCED PRACTICE MIDWIFE

## 2023-03-20 PROCEDURE — 3074F SYST BP LT 130 MM HG: CPT | Mod: CPTII,,, | Performed by: ADVANCED PRACTICE MIDWIFE

## 2023-03-20 PROCEDURE — 3074F PR MOST RECENT SYSTOLIC BLOOD PRESSURE < 130 MM HG: ICD-10-PCS | Mod: CPTII,,, | Performed by: ADVANCED PRACTICE MIDWIFE

## 2023-03-20 RX ORDER — PYRIDOXINE HCL (VITAMIN B6) 100 MG
TABLET ORAL
COMMUNITY

## 2023-03-20 NOTE — PROGRESS NOTES
Subjective:       Patient ID: Glenys Modi is a 32 y.o. female.    Chief Complaint: IUD Check (In for IUD string check. Denies any problems at this time.)    In for IUD string check  Doing well with IUD.  Reports irregular bleeding that is light  Discussed Pap results    Review of Systems   Constitutional: Negative.    HENT: Negative.     Eyes: Negative.    Respiratory: Negative.     Cardiovascular: Negative.    Gastrointestinal: Negative.    Endocrine: Negative.    Genitourinary: Negative.    Musculoskeletal: Negative.    Integumentary:  Negative.   Allergic/Immunologic: Negative.    Neurological: Negative.    Psychiatric/Behavioral: Negative.         Objective:      Physical Exam  Vitals reviewed.   Constitutional:       Appearance: Normal appearance.   HENT:      Head: Normocephalic.   Cardiovascular:      Rate and Rhythm: Normal rate.   Pulmonary:      Effort: Pulmonary effort is normal.   Abdominal:      General: Abdomen is flat.      Palpations: Abdomen is soft.   Genitourinary:     General: Normal vulva.      Vagina: Normal.      Cervix: Normal.      Comments: IUD strings extending from cervical os  Musculoskeletal:         General: Normal range of motion.      Cervical back: Normal range of motion.   Skin:     General: Skin is warm and dry.   Neurological:      Mental Status: She is alert and oriented to person, place, and time.   Psychiatric:         Mood and Affect: Mood normal.         Behavior: Behavior normal.       Assessment:       Problem List Items Addressed This Visit    None  Visit Diagnoses       Encounter for routine checking of intrauterine contraceptive device (IUD)    -  Primary              Plan:       F/U in 1 year or as needed

## 2023-11-13 PROBLEM — Z3A.38 38 WEEKS GESTATION OF PREGNANCY: Status: RESOLVED | Noted: 2022-12-01 | Resolved: 2023-11-13

## 2024-04-25 ENCOUNTER — OFFICE VISIT (OUTPATIENT)
Dept: OBSTETRICS AND GYNECOLOGY | Facility: CLINIC | Age: 34
End: 2024-04-25
Payer: COMMERCIAL

## 2024-04-25 VITALS
HEART RATE: 80 BPM | SYSTOLIC BLOOD PRESSURE: 110 MMHG | WEIGHT: 150 LBS | HEIGHT: 64 IN | BODY MASS INDEX: 25.61 KG/M2 | OXYGEN SATURATION: 100 % | DIASTOLIC BLOOD PRESSURE: 72 MMHG | RESPIRATION RATE: 18 BRPM

## 2024-04-25 DIAGNOSIS — Z97.5 PRESENCE OF (INTRAUTERINE) CONTRACEPTIVE DEVICE: ICD-10-CM

## 2024-04-25 DIAGNOSIS — Z12.4 SCREENING FOR CERVICAL CANCER: ICD-10-CM

## 2024-04-25 DIAGNOSIS — Z87.42 HISTORY OF ABNORMAL CERVICAL PAP SMEAR: ICD-10-CM

## 2024-04-25 DIAGNOSIS — Z01.419 NORMAL GYNECOLOGIC EXAMINATION: Primary | ICD-10-CM

## 2024-04-25 PROCEDURE — 87624 HPV HI-RISK TYP POOLED RSLT: CPT | Mod: ,,, | Performed by: CLINICAL MEDICAL LABORATORY

## 2024-04-25 PROCEDURE — 3078F DIAST BP <80 MM HG: CPT | Mod: CPTII,,, | Performed by: ADVANCED PRACTICE MIDWIFE

## 2024-04-25 PROCEDURE — 3008F BODY MASS INDEX DOCD: CPT | Mod: CPTII,,, | Performed by: ADVANCED PRACTICE MIDWIFE

## 2024-04-25 PROCEDURE — 88141 CYTOPATH C/V INTERPRET: CPT | Mod: ,,, | Performed by: PATHOLOGY

## 2024-04-25 PROCEDURE — 3074F SYST BP LT 130 MM HG: CPT | Mod: CPTII,,, | Performed by: ADVANCED PRACTICE MIDWIFE

## 2024-04-25 PROCEDURE — 99213 OFFICE O/P EST LOW 20 MIN: CPT | Mod: PBBFAC | Performed by: ADVANCED PRACTICE MIDWIFE

## 2024-04-25 PROCEDURE — 88142 CYTOPATH C/V THIN LAYER: CPT | Mod: TC,GCY | Performed by: ADVANCED PRACTICE MIDWIFE

## 2024-04-25 PROCEDURE — 1159F MED LIST DOCD IN RCRD: CPT | Mod: CPTII,,, | Performed by: ADVANCED PRACTICE MIDWIFE

## 2024-04-25 PROCEDURE — 99395 PREV VISIT EST AGE 18-39: CPT | Mod: S$PBB,,, | Performed by: ADVANCED PRACTICE MIDWIFE

## 2024-04-25 NOTE — PROGRESS NOTES
Subjective     Patient ID: Glenys Modi is a 33 y.o. female.    Chief Complaint: Gynecologic Exam (In for Annual Exam. Denies any problems at this time. Pts last pap was in 2023./)    Pap collected today, HPV positive on 2023 pap.  Periods are monthly.   Denies any complaints or problems.     Gynecologic Exam      Review of Systems   Constitutional: Negative.    HENT: Negative.     Eyes: Negative.    Respiratory: Negative.     Cardiovascular: Negative.    Gastrointestinal: Negative.    Endocrine: Negative.    Genitourinary: Negative.    Musculoskeletal: Negative.    Integumentary:  Negative.   Allergic/Immunologic: Negative.    Neurological: Negative.    Psychiatric/Behavioral: Negative.            Objective     Physical Exam  Vitals reviewed. Exam conducted with a chaperone present.   Constitutional:       Appearance: Normal appearance.   HENT:      Head: Normocephalic.   Cardiovascular:      Rate and Rhythm: Normal rate and regular rhythm.   Pulmonary:      Effort: Pulmonary effort is normal.      Breath sounds: Normal breath sounds.   Chest:   Breasts:     Right: Normal. No mass.      Left: Normal. No mass.   Abdominal:      General: Abdomen is flat.      Palpations: Abdomen is soft.   Genitourinary:     General: Normal vulva.      Exam position: Lithotomy position.      Vagina: Normal.      Cervix: No cervical motion tenderness.      Uterus: Normal. Not tender.       Adnexa: Right adnexa normal and left adnexa normal.        Right: No mass.          Left: No mass.        Comments: IUD strings noted extending from cervical os  Musculoskeletal:         General: Normal range of motion.      Cervical back: Normal range of motion.   Skin:     General: Skin is warm and dry.   Neurological:      Mental Status: She is alert and oriented to person, place, and time.   Psychiatric:         Mood and Affect: Mood normal.         Behavior: Behavior normal.            Assessment and Plan     1. Normal gynecologic  examination    2. Screening for cervical cancer  -     ThinPrep Pap Test  -     HPV DNA probe, amplified    3. History of abnormal cervical Pap smear  -     ThinPrep Pap Test  -     HPV DNA probe, amplified           Follow up in about 1 year (around 4/25/2025), or if symptoms worsen or fail to improve, for Annual Exam.

## 2024-04-30 LAB
GH SERPL-MCNC: ABNORMAL NG/ML
INSULIN SERPL-ACNC: ABNORMAL U[IU]/ML
LAB AP CLINICAL INFORMATION: ABNORMAL
LAB AP GYN INTERPRETATION: ABNORMAL
LAB AP PAP DISCLAIMER COMMENTS: ABNORMAL
RENIN PLAS-CCNC: ABNORMAL NG/ML/H

## 2024-05-01 LAB
HPV 16: NEGATIVE
HPV 18: NEGATIVE
HPV OTHER: POSITIVE

## 2024-05-07 ENCOUNTER — PATIENT MESSAGE (OUTPATIENT)
Dept: OBSTETRICS AND GYNECOLOGY | Facility: CLINIC | Age: 34
End: 2024-05-07
Payer: COMMERCIAL

## 2024-05-08 ENCOUNTER — TELEPHONE (OUTPATIENT)
Dept: OBSTETRICS AND GYNECOLOGY | Facility: CLINIC | Age: 34
End: 2024-05-08
Payer: COMMERCIAL

## 2024-05-08 DIAGNOSIS — R87.619 ABNORMAL CERVICAL PAPANICOLAOU SMEAR, UNSPECIFIED ABNORMAL PAP FINDING: Primary | ICD-10-CM

## 2024-05-08 NOTE — TELEPHONE ENCOUNTER
----- Message from Krista Ayala LPN sent at 5/8/2024  8:41 AM CDT -----  I will put in the order for this with Dr. Allen.  ----- Message -----  From: Mana Andrea CNM  Sent: 5/7/2024   7:27 PM CDT  To: Emre Adair Staff    Pt notified of pap results via my chart.  Please schedule Colposcopy with Dr Allen and notify patient of date and time of procedure.

## 2024-06-03 ENCOUNTER — TELEPHONE (OUTPATIENT)
Dept: OBSTETRICS AND GYNECOLOGY | Facility: CLINIC | Age: 34
End: 2024-06-03
Payer: COMMERCIAL

## 2024-06-03 ENCOUNTER — PROCEDURE VISIT (OUTPATIENT)
Dept: OBSTETRICS AND GYNECOLOGY | Facility: CLINIC | Age: 34
End: 2024-06-03
Payer: COMMERCIAL

## 2024-06-03 VITALS
SYSTOLIC BLOOD PRESSURE: 109 MMHG | HEIGHT: 64 IN | DIASTOLIC BLOOD PRESSURE: 74 MMHG | HEART RATE: 64 BPM | RESPIRATION RATE: 16 BRPM | WEIGHT: 148 LBS | BODY MASS INDEX: 25.27 KG/M2

## 2024-06-03 DIAGNOSIS — Z01.812 PRE-PROCEDURE LAB EXAM: ICD-10-CM

## 2024-06-03 DIAGNOSIS — N72 HIGH RISK HUMAN PAPILLOMA VIRUS (HPV) INFECTION OF CERVIX: ICD-10-CM

## 2024-06-03 DIAGNOSIS — R87.611 PAP SMEAR OF CERVIX WITH ASCUS, CANNOT EXCLUDE HGSIL: Primary | ICD-10-CM

## 2024-06-03 DIAGNOSIS — B97.7 HIGH RISK HUMAN PAPILLOMA VIRUS (HPV) INFECTION OF CERVIX: ICD-10-CM

## 2024-06-03 DIAGNOSIS — R87.619 ABNORMAL CERVICAL PAPANICOLAOU SMEAR, UNSPECIFIED ABNORMAL PAP FINDING: ICD-10-CM

## 2024-06-03 LAB
B-HCG UR QL: NEGATIVE
CTP QC/QA: YES

## 2024-06-03 PROCEDURE — 99499 UNLISTED E&M SERVICE: CPT | Mod: S$PBB,,, | Performed by: STUDENT IN AN ORGANIZED HEALTH CARE EDUCATION/TRAINING PROGRAM

## 2024-06-03 PROCEDURE — 81025 URINE PREGNANCY TEST: CPT | Mod: PBBFAC | Performed by: STUDENT IN AN ORGANIZED HEALTH CARE EDUCATION/TRAINING PROGRAM

## 2024-06-03 PROCEDURE — 57454 BX/CURETT OF CERVIX W/SCOPE: CPT | Mod: PBBFAC | Performed by: STUDENT IN AN ORGANIZED HEALTH CARE EDUCATION/TRAINING PROGRAM

## 2024-06-03 PROCEDURE — 88342 IMHCHEM/IMCYTCHM 1ST ANTB: CPT | Mod: 26,,, | Performed by: PATHOLOGY

## 2024-06-03 PROCEDURE — 99999PBSHW POCT URINE PREGNANCY: Mod: PBBFAC,,,

## 2024-06-03 PROCEDURE — 88305 TISSUE EXAM BY PATHOLOGIST: CPT | Mod: 26,,, | Performed by: PATHOLOGY

## 2024-06-03 PROCEDURE — 88305 TISSUE EXAM BY PATHOLOGIST: CPT | Mod: TC,91,SUR | Performed by: STUDENT IN AN ORGANIZED HEALTH CARE EDUCATION/TRAINING PROGRAM

## 2024-06-03 RX ORDER — FLUCONAZOLE 150 MG/1
150 TABLET ORAL ONCE
Qty: 1 TABLET | Refills: 0 | Status: SHIPPED | OUTPATIENT
Start: 2024-06-03 | End: 2024-06-03

## 2024-06-03 RX ORDER — FLUCONAZOLE 150 MG/1
150 TABLET ORAL DAILY
Qty: 1 TABLET | Refills: 0 | Status: CANCELLED | OUTPATIENT
Start: 2024-06-03 | End: 2024-06-04

## 2024-06-03 NOTE — TELEPHONE ENCOUNTER
----- Message from Hyun Thomas sent at 6/3/2024 11:29 AM CDT -----  Patient is calling checking to see when will you call in her prescription [phone]

## 2024-06-03 NOTE — PROCEDURES
Colposcopy W/BIOPSY AND ECC- Future    Date/Time: 6/3/2024 9:00 AM    Performed by: Jonah Allen DO  Authorized by: Jonah Allen DO    Consent obatined:  Prior to procedure the appropriate consent was completed and verified  Timeout:Immediately prior to procedure a time out was called to verify the correct patient, procedure, equipment, support staff and site/side marked as required  Prep:Patient was prepped and draped in the usual sterile fashion  Assistants?: Yes     I was present for the entire procedure.    Colposcopy Site:  Cervix  Position:  Supine  Acrowhite Lesion: No    Atypical Vessels: No    Transformation Zone Adequate?: No    Biopsy?: Yes         Location:  Cervix ((6 00))  ECC Performed?: Yes    LEEP Performed?: No    Estimated blood loss (cc):  5   Patient tolerated the procedure well with no immediate complications.   Post-operative instructions were provided for the patient.   Patient was discharged and will follow up if any complications occur

## 2024-06-03 NOTE — TELEPHONE ENCOUNTER
----- Message from Lianna Ramirez CMA sent at 6/3/2024 10:00 AM CDT -----  Regarding: RE: follow up appointment    ----- Message -----  From: Froylan Mejía  Sent: 6/3/2024   9:37 AM CDT  To: Tao Mckenzie Staff  Subject: follow up appointment                            Please make the pt an appointment to come back in 2wks for a follow up. The schedule wasn't allowing us to add her. Please pt laz @ [phone]      Thanks Cami LYONS

## 2024-06-05 LAB
DHEA SERPL-MCNC: NORMAL
ESTROGEN SERPL-MCNC: NORMAL PG/ML
INSULIN SERPL-ACNC: NORMAL U[IU]/ML
LAB AP CLINICAL INFORMATION: NORMAL
LAB AP GROSS DESCRIPTION: NORMAL
LAB AP LABORATORY NOTES: NORMAL
T3RU NFR SERPL: NORMAL %

## 2024-06-17 ENCOUNTER — OFFICE VISIT (OUTPATIENT)
Dept: OBSTETRICS AND GYNECOLOGY | Facility: CLINIC | Age: 34
End: 2024-06-17
Payer: COMMERCIAL

## 2024-06-17 VITALS
RESPIRATION RATE: 16 BRPM | DIASTOLIC BLOOD PRESSURE: 76 MMHG | HEART RATE: 76 BPM | WEIGHT: 150 LBS | SYSTOLIC BLOOD PRESSURE: 124 MMHG | HEIGHT: 64 IN | BODY MASS INDEX: 25.61 KG/M2

## 2024-06-17 DIAGNOSIS — R87.619 ABNORMAL CERVICAL PAPANICOLAOU SMEAR, UNSPECIFIED ABNORMAL PAP FINDING: Primary | ICD-10-CM

## 2024-06-17 PROCEDURE — 99213 OFFICE O/P EST LOW 20 MIN: CPT | Mod: PBBFAC | Performed by: STUDENT IN AN ORGANIZED HEALTH CARE EDUCATION/TRAINING PROGRAM

## 2024-06-17 PROCEDURE — 1159F MED LIST DOCD IN RCRD: CPT | Mod: CPTII,,, | Performed by: STUDENT IN AN ORGANIZED HEALTH CARE EDUCATION/TRAINING PROGRAM

## 2024-06-17 PROCEDURE — 99999 PR PBB SHADOW E&M-EST. PATIENT-LVL III: CPT | Mod: PBBFAC,,, | Performed by: STUDENT IN AN ORGANIZED HEALTH CARE EDUCATION/TRAINING PROGRAM

## 2024-06-17 PROCEDURE — 3074F SYST BP LT 130 MM HG: CPT | Mod: CPTII,,, | Performed by: STUDENT IN AN ORGANIZED HEALTH CARE EDUCATION/TRAINING PROGRAM

## 2024-06-17 PROCEDURE — 3008F BODY MASS INDEX DOCD: CPT | Mod: CPTII,,, | Performed by: STUDENT IN AN ORGANIZED HEALTH CARE EDUCATION/TRAINING PROGRAM

## 2024-06-17 PROCEDURE — 99213 OFFICE O/P EST LOW 20 MIN: CPT | Mod: S$PBB,,, | Performed by: STUDENT IN AN ORGANIZED HEALTH CARE EDUCATION/TRAINING PROGRAM

## 2024-06-17 PROCEDURE — 3078F DIAST BP <80 MM HG: CPT | Mod: CPTII,,, | Performed by: STUDENT IN AN ORGANIZED HEALTH CARE EDUCATION/TRAINING PROGRAM

## 2024-06-30 NOTE — PROGRESS NOTES
"  Return Gyn Office Visit    Assessment/Plan  Problem List Items Addressed This Visit    None        CC:   Chief Complaint   Patient presents with    Follow-up     Discuss colpo results        HPI:  33 y.o. who presents to office for colpo follow up.    Review of Systems - The following ROS was otherwise negative, except as noted in the HPI:  constitutional, respiratory, cardiovascular, gastrointestinal, genitourinary    Objective:  /76   Pulse 76   Resp 16   Ht 5' 4" (1.626 m)   Wt 68 kg (150 lb)   Breastfeeding No   BMI 25.75 kg/m²   General: Alert, well appearing, no acute distress  Abdomen: Soft, nontender, nondistended   Pelvic: deferred  Extremities: No redness or tenderness, neg Susie's sign  Osteopathic: no TART changes    Return in 6 mo for Pap.  "

## 2024-12-19 ENCOUNTER — OFFICE VISIT (OUTPATIENT)
Dept: OBSTETRICS AND GYNECOLOGY | Facility: CLINIC | Age: 34
End: 2024-12-19
Payer: COMMERCIAL

## 2024-12-19 VITALS
SYSTOLIC BLOOD PRESSURE: 114 MMHG | BODY MASS INDEX: 24.99 KG/M2 | HEART RATE: 85 BPM | TEMPERATURE: 98 F | DIASTOLIC BLOOD PRESSURE: 74 MMHG | WEIGHT: 146.38 LBS | HEIGHT: 64 IN | RESPIRATION RATE: 18 BRPM | OXYGEN SATURATION: 100 %

## 2024-12-19 DIAGNOSIS — Z12.4 CERVICAL CANCER SCREENING: ICD-10-CM

## 2024-12-19 DIAGNOSIS — Z01.419 ENCOUNTER FOR WELL WOMAN EXAM WITH ROUTINE GYNECOLOGICAL EXAM: Primary | ICD-10-CM

## 2024-12-19 PROCEDURE — 99999 PR PBB SHADOW E&M-EST. PATIENT-LVL III: CPT | Mod: PBBFAC,,, | Performed by: STUDENT IN AN ORGANIZED HEALTH CARE EDUCATION/TRAINING PROGRAM

## 2024-12-19 PROCEDURE — 87624 HPV HI-RISK TYP POOLED RSLT: CPT | Mod: ,,, | Performed by: CLINICAL MEDICAL LABORATORY

## 2024-12-19 PROCEDURE — 3008F BODY MASS INDEX DOCD: CPT | Mod: CPTII,,, | Performed by: STUDENT IN AN ORGANIZED HEALTH CARE EDUCATION/TRAINING PROGRAM

## 2024-12-19 PROCEDURE — 99213 OFFICE O/P EST LOW 20 MIN: CPT | Mod: PBBFAC | Performed by: STUDENT IN AN ORGANIZED HEALTH CARE EDUCATION/TRAINING PROGRAM

## 2024-12-19 PROCEDURE — 3074F SYST BP LT 130 MM HG: CPT | Mod: CPTII,,, | Performed by: STUDENT IN AN ORGANIZED HEALTH CARE EDUCATION/TRAINING PROGRAM

## 2024-12-19 PROCEDURE — 99395 PREV VISIT EST AGE 18-39: CPT | Mod: S$PBB,,, | Performed by: STUDENT IN AN ORGANIZED HEALTH CARE EDUCATION/TRAINING PROGRAM

## 2024-12-19 PROCEDURE — 88142 CYTOPATH C/V THIN LAYER: CPT | Mod: TC,GCY | Performed by: STUDENT IN AN ORGANIZED HEALTH CARE EDUCATION/TRAINING PROGRAM

## 2024-12-19 PROCEDURE — 88141 CYTOPATH C/V INTERPRET: CPT | Mod: ,,, | Performed by: PATHOLOGY

## 2024-12-19 PROCEDURE — 3078F DIAST BP <80 MM HG: CPT | Mod: CPTII,,, | Performed by: STUDENT IN AN ORGANIZED HEALTH CARE EDUCATION/TRAINING PROGRAM

## 2024-12-19 PROCEDURE — 1159F MED LIST DOCD IN RCRD: CPT | Mod: CPTII,,, | Performed by: STUDENT IN AN ORGANIZED HEALTH CARE EDUCATION/TRAINING PROGRAM

## 2024-12-31 ENCOUNTER — TELEPHONE (OUTPATIENT)
Dept: OBSTETRICS AND GYNECOLOGY | Facility: CLINIC | Age: 34
End: 2024-12-31
Payer: COMMERCIAL

## 2024-12-31 LAB
HPV 16: NEGATIVE
HPV 18: NEGATIVE
HPV OTHER: POSITIVE

## 2024-12-31 NOTE — TELEPHONE ENCOUNTER
Spoke with pt about results and recommendations; pt voiced understanding and appt made. Interested in Gardasil.       ----- Message from BRADY Tavares sent at 12/30/2024  5:36 PM CST -----  Regarding: Pasquale    ----- Message -----  From: Jonah Allen DO  Sent: 12/30/2024   8:53 AM CST  To: BRADY Berrios

## 2025-01-01 NOTE — PROGRESS NOTES
"Subjective:       Glenys Modi is a 34 y.o. female here for a routine exam.  Current complaints: none.  Personal health questionnaire reviewed: yes.     Gynecologic History  No LMP recorded.  Contraception: mirena  Last Pap: 2024. Results were: abnormal    Obstetric History  OB History    Para Term  AB Living   4 4 4 0 0 4   SAB IAB Ectopic Multiple Live Births         0 4      # Outcome Date GA Lbr [place]/2nd Weight Sex Type Anes PTL Lv   4 Term 22 38w0d / 00:36 2.73 kg (6 lb 0.3 oz) M Vag-Spont EPI N SARA   3 Term 19   2.92 kg (6 lb 7 oz) F Vag-Spont  N SARA   2 Term 13   2.977 kg (6 lb 9 oz) F Vag-Spont  N SARA   1 Term 08   2.778 kg (6 lb 2 oz) M Vag-Spont  N SARA         The following portions of the patient's history were reviewed and updated as appropriate: allergies, current medications, past family history, past medical history, past social history, past surgical history, and problem list.    Review of Systems  Pertinent items are noted in HPI.      Objective:      /74 (BP Location: Left arm, Patient Position: Sitting)   Pulse 85   Temp 98.1 °F (36.7 °C)   Resp 18   Ht 5' 4" (1.626 m)   Wt 66.4 kg (146 lb 6.4 oz)   SpO2 100%   BMI 25.13 kg/m²   General appearance: alert, appears stated age, and cooperative  Lungs:  unlabored respirations  Breasts: normal appearance, no masses or tenderness, Inspection negative  Abdomen: soft, non-tender; bowel sounds normal; no masses,  no organomegaly  Pelvic: cervix normal in appearance, external genitalia normal, no adnexal masses or tenderness, no cervical motion tenderness, uterus normal size, shape, and consistency, and vagina normal without discharge  Extremities: extremities normal, atraumatic, no cyanosis or edema  Skin: Skin color, texture, turgor normal. No rashes or lesions      Assessment:      Healthy female exam.      Plan:      Pap smear pending  Will notify of results    "

## 2025-01-02 ENCOUNTER — PATIENT MESSAGE (OUTPATIENT)
Dept: OBSTETRICS AND GYNECOLOGY | Facility: CLINIC | Age: 35
End: 2025-01-02
Payer: COMMERCIAL

## 2025-01-16 ENCOUNTER — PROCEDURE VISIT (OUTPATIENT)
Dept: OBSTETRICS AND GYNECOLOGY | Facility: CLINIC | Age: 35
End: 2025-01-16
Payer: COMMERCIAL

## 2025-01-16 VITALS — WEIGHT: 146 LBS | SYSTOLIC BLOOD PRESSURE: 111 MMHG | BODY MASS INDEX: 25.06 KG/M2 | DIASTOLIC BLOOD PRESSURE: 81 MMHG

## 2025-01-16 DIAGNOSIS — R87.612 LGSIL ON PAP SMEAR OF CERVIX: Primary | ICD-10-CM

## 2025-01-16 DIAGNOSIS — Z01.812 PRE-PROCEDURE LAB EXAM: ICD-10-CM

## 2025-01-16 LAB
B-HCG UR QL: NEGATIVE
CTP QC/QA: YES

## 2025-01-16 PROCEDURE — 88305 TISSUE EXAM BY PATHOLOGIST: CPT | Mod: 26,,, | Performed by: PATHOLOGY

## 2025-01-16 PROCEDURE — 99499 UNLISTED E&M SERVICE: CPT | Mod: S$PBB,,, | Performed by: STUDENT IN AN ORGANIZED HEALTH CARE EDUCATION/TRAINING PROGRAM

## 2025-01-16 PROCEDURE — 88305 TISSUE EXAM BY PATHOLOGIST: CPT | Mod: TC,91,SUR | Performed by: STUDENT IN AN ORGANIZED HEALTH CARE EDUCATION/TRAINING PROGRAM

## 2025-01-16 PROCEDURE — 81025 URINE PREGNANCY TEST: CPT | Mod: PBBFAC | Performed by: STUDENT IN AN ORGANIZED HEALTH CARE EDUCATION/TRAINING PROGRAM

## 2025-01-16 PROCEDURE — 99999PBSHW POCT URINE PREGNANCY: Mod: PBBFAC,,,

## 2025-01-16 PROCEDURE — 57454 BX/CURETT OF CERVIX W/SCOPE: CPT | Mod: PBBFAC | Performed by: STUDENT IN AN ORGANIZED HEALTH CARE EDUCATION/TRAINING PROGRAM

## 2025-01-21 LAB
ESTROGEN SERPL-MCNC: NORMAL PG/ML
INSULIN SERPL-ACNC: NORMAL U[IU]/ML
LAB AP CLINICAL INFORMATION: NORMAL
LAB AP GROSS DESCRIPTION: NORMAL
LAB AP LABORATORY NOTES: NORMAL
T3RU NFR SERPL: NORMAL %

## 2025-01-22 ENCOUNTER — OFFICE VISIT (OUTPATIENT)
Dept: OBSTETRICS AND GYNECOLOGY | Facility: CLINIC | Age: 35
End: 2025-01-22
Payer: COMMERCIAL

## 2025-01-22 VITALS
SYSTOLIC BLOOD PRESSURE: 125 MMHG | DIASTOLIC BLOOD PRESSURE: 82 MMHG | BODY MASS INDEX: 24.92 KG/M2 | WEIGHT: 146 LBS | HEIGHT: 64 IN | HEART RATE: 79 BPM

## 2025-01-22 DIAGNOSIS — N87.1 CIN II (CERVICAL INTRAEPITHELIAL NEOPLASIA II): Primary | ICD-10-CM

## 2025-01-22 PROCEDURE — 3074F SYST BP LT 130 MM HG: CPT | Mod: CPTII,,, | Performed by: STUDENT IN AN ORGANIZED HEALTH CARE EDUCATION/TRAINING PROGRAM

## 2025-01-22 PROCEDURE — 3008F BODY MASS INDEX DOCD: CPT | Mod: CPTII,,, | Performed by: STUDENT IN AN ORGANIZED HEALTH CARE EDUCATION/TRAINING PROGRAM

## 2025-01-22 PROCEDURE — 99999 PR PBB SHADOW E&M-EST. PATIENT-LVL IV: CPT | Mod: PBBFAC,,, | Performed by: STUDENT IN AN ORGANIZED HEALTH CARE EDUCATION/TRAINING PROGRAM

## 2025-01-22 PROCEDURE — 99213 OFFICE O/P EST LOW 20 MIN: CPT | Mod: S$PBB,,, | Performed by: STUDENT IN AN ORGANIZED HEALTH CARE EDUCATION/TRAINING PROGRAM

## 2025-01-22 PROCEDURE — 99214 OFFICE O/P EST MOD 30 MIN: CPT | Mod: PBBFAC | Performed by: STUDENT IN AN ORGANIZED HEALTH CARE EDUCATION/TRAINING PROGRAM

## 2025-01-22 PROCEDURE — 3079F DIAST BP 80-89 MM HG: CPT | Mod: CPTII,,, | Performed by: STUDENT IN AN ORGANIZED HEALTH CARE EDUCATION/TRAINING PROGRAM

## 2025-01-30 ENCOUNTER — PATIENT MESSAGE (OUTPATIENT)
Dept: OBSTETRICS AND GYNECOLOGY | Facility: CLINIC | Age: 35
End: 2025-01-30
Payer: COMMERCIAL

## 2025-01-30 NOTE — PROCEDURES
Colposcopy    Date/Time: 1/16/2025 10:00 AM    Performed by: Jonah Allen DO  Authorized by: Jonah Allen DO    Consent obatined:  Prior to procedure the appropriate consent was completed and verified  Timeout:Immediately prior to procedure a time out was called to verify the correct patient, procedure, equipment, support staff and site/side marked as required  Prep:Patient was prepped and draped in the usual sterile fashion  Assistants?: Yes     I was present for the entire procedure.    Colposcopy Site:  Cervix  Position:  Supine   Patient was prepped and draped in the normal sterile fashion.  Acrowhite Lesion: No    Atypical Vessels: No    Transformation Zone Adequate?: Yes    Biopsy?: Yes         Location:  Cervix ((2 00 and 7 00))  ECC Performed?: Yes    LEEP Performed?: No    Estimated blood loss (cc):  5   Patient tolerated the procedure well with no immediate complications.   Post-operative instructions were provided for the patient.   Patient was discharged and will follow up if any complications occur

## 2025-02-01 NOTE — PROGRESS NOTES
"  Return Gyn Office Visit    Assessment/Plan  Problem List Items Addressed This Visit    None  Visit Diagnoses       EPIFANIO II (cervical intraepithelial neoplasia II)    -  Primary    Relevant Orders    Case Request Operating Room: LEEP (LOOP ELECTROSURGICAL EXCISION PROCEDURE) (Completed)              CC:   Chief Complaint   Patient presents with    Follow-up     HPI:  34 y.o. who presents to office for colpo results.    Review of Systems - The following ROS was otherwise negative, except as noted in the HPI:  constitutional, respiratory, cardiovascular, gastrointestinal, genitourinary    Objective:  /82   Pulse 79   Ht 5' 4" (1.626 m)   Wt 66.2 kg (146 lb)   LMP  (LMP Unknown)   BMI 25.06 kg/m²   General: Alert, well appearing, no acute distress  Abdomen: Soft, nontender, nondistended   Pelvic: deferred  Extremities: No redness or tenderness, neg Susie's sign  Osteopathic: no TART changes    Recommend proceeding with excisional treatment  Scheduled for LEEP on 3/4  "

## 2025-02-01 NOTE — PROGRESS NOTES
"  Return Gyn Office Visit    Assessment/Plan  Problem List Items Addressed This Visit    None  Visit Diagnoses       EPIFANIO II (cervical intraepithelial neoplasia II)    -  Primary    Relevant Orders    Case Request Operating Room: LEEP (LOOP ELECTROSURGICAL EXCISION PROCEDURE) (Completed)              CC:   Chief Complaint   Patient presents with    Follow-up     HPI:  34 y.o. who presents to office for colpo results.    Review of Systems - The following ROS was otherwise negative, except as noted in the HPI:  constitutional, respiratory, cardiovascular, gastrointestinal, genitourinary    Objective:  /82   Pulse 79   Ht 5' 4" (1.626 m)   Wt 66.2 kg (146 lb)   LMP  (LMP Unknown)   BMI 25.06 kg/m²   General: Alert, well appearing, no acute distress  Abdomen: Soft, nontender, nondistended   Pelvic: deferred  Extremities: No redness or tenderness, neg Susie's sign  Osteopathic: no TART changes    Due to advanced cervical dysplasia, recommend proceeding urgently with LEEP for excisional treatment  Scheduled for LEEP on 2/4  "

## 2025-02-03 ENCOUNTER — TELEPHONE (OUTPATIENT)
Dept: OBSTETRICS AND GYNECOLOGY | Facility: CLINIC | Age: 35
End: 2025-02-03
Payer: COMMERCIAL

## 2025-02-04 ENCOUNTER — ANESTHESIA EVENT (OUTPATIENT)
Dept: SURGERY | Facility: HOSPITAL | Age: 35
End: 2025-02-04
Payer: COMMERCIAL

## 2025-02-04 ENCOUNTER — ANESTHESIA (OUTPATIENT)
Dept: SURGERY | Facility: HOSPITAL | Age: 35
End: 2025-02-04
Payer: COMMERCIAL

## 2025-02-04 ENCOUNTER — HOSPITAL ENCOUNTER (OUTPATIENT)
Facility: HOSPITAL | Age: 35
Discharge: HOME | End: 2025-02-04
Attending: STUDENT IN AN ORGANIZED HEALTH CARE EDUCATION/TRAINING PROGRAM | Admitting: STUDENT IN AN ORGANIZED HEALTH CARE EDUCATION/TRAINING PROGRAM
Payer: COMMERCIAL

## 2025-02-04 VITALS
HEART RATE: 75 BPM | BODY MASS INDEX: 24.92 KG/M2 | OXYGEN SATURATION: 98 % | WEIGHT: 146 LBS | TEMPERATURE: 98 F | DIASTOLIC BLOOD PRESSURE: 52 MMHG | SYSTOLIC BLOOD PRESSURE: 106 MMHG | HEIGHT: 64 IN | RESPIRATION RATE: 18 BRPM

## 2025-02-04 DIAGNOSIS — N87.1 CIN II (CERVICAL INTRAEPITHELIAL NEOPLASIA II): ICD-10-CM

## 2025-02-04 LAB
B-HCG UR QL: NEGATIVE
CTP QC/QA: YES

## 2025-02-04 PROCEDURE — 36000706: Performed by: STUDENT IN AN ORGANIZED HEALTH CARE EDUCATION/TRAINING PROGRAM

## 2025-02-04 PROCEDURE — D9220A PRA ANESTHESIA: Mod: CRNA,,, | Performed by: NURSE ANESTHETIST, CERTIFIED REGISTERED

## 2025-02-04 PROCEDURE — 71000033 HC RECOVERY, INTIAL HOUR: Performed by: STUDENT IN AN ORGANIZED HEALTH CARE EDUCATION/TRAINING PROGRAM

## 2025-02-04 PROCEDURE — 37000008 HC ANESTHESIA 1ST 15 MINUTES: Performed by: STUDENT IN AN ORGANIZED HEALTH CARE EDUCATION/TRAINING PROGRAM

## 2025-02-04 PROCEDURE — 57522 CONIZATION OF CERVIX: CPT | Mod: ,,, | Performed by: STUDENT IN AN ORGANIZED HEALTH CARE EDUCATION/TRAINING PROGRAM

## 2025-02-04 PROCEDURE — 25000003 PHARM REV CODE 250: Performed by: STUDENT IN AN ORGANIZED HEALTH CARE EDUCATION/TRAINING PROGRAM

## 2025-02-04 PROCEDURE — 63600175 PHARM REV CODE 636 W HCPCS

## 2025-02-04 PROCEDURE — 88307 TISSUE EXAM BY PATHOLOGIST: CPT | Mod: 26,,, | Performed by: PATHOLOGY

## 2025-02-04 PROCEDURE — 88307 TISSUE EXAM BY PATHOLOGIST: CPT | Mod: TC,91,SUR | Performed by: STUDENT IN AN ORGANIZED HEALTH CARE EDUCATION/TRAINING PROGRAM

## 2025-02-04 PROCEDURE — 36000707: Performed by: STUDENT IN AN ORGANIZED HEALTH CARE EDUCATION/TRAINING PROGRAM

## 2025-02-04 PROCEDURE — 63600175 PHARM REV CODE 636 W HCPCS: Performed by: STUDENT IN AN ORGANIZED HEALTH CARE EDUCATION/TRAINING PROGRAM

## 2025-02-04 PROCEDURE — 27000177 HC AIRWAY, LARYNGEAL MASK: Performed by: ANESTHESIOLOGY

## 2025-02-04 PROCEDURE — 27000716 HC OXISENSOR PROBE, ANY SIZE: Performed by: ANESTHESIOLOGY

## 2025-02-04 PROCEDURE — 88342 IMHCHEM/IMCYTCHM 1ST ANTB: CPT | Mod: 26,,, | Performed by: PATHOLOGY

## 2025-02-04 PROCEDURE — D9220A PRA ANESTHESIA: Mod: ANES,,, | Performed by: ANESTHESIOLOGY

## 2025-02-04 PROCEDURE — 63600175 PHARM REV CODE 636 W HCPCS: Performed by: ANESTHESIOLOGY

## 2025-02-04 PROCEDURE — 27000510 HC BLANKET BAIR HUGGER ANY SIZE: Performed by: ANESTHESIOLOGY

## 2025-02-04 PROCEDURE — 63600175 PHARM REV CODE 636 W HCPCS: Mod: TB,JZ | Performed by: NURSE ANESTHETIST, CERTIFIED REGISTERED

## 2025-02-04 PROCEDURE — 25000003 PHARM REV CODE 250: Performed by: ANESTHESIOLOGY

## 2025-02-04 PROCEDURE — 71000016 HC POSTOP RECOV ADDL HR: Performed by: STUDENT IN AN ORGANIZED HEALTH CARE EDUCATION/TRAINING PROGRAM

## 2025-02-04 PROCEDURE — 27000655: Performed by: ANESTHESIOLOGY

## 2025-02-04 PROCEDURE — 71000015 HC POSTOP RECOV 1ST HR: Performed by: STUDENT IN AN ORGANIZED HEALTH CARE EDUCATION/TRAINING PROGRAM

## 2025-02-04 PROCEDURE — 37000009 HC ANESTHESIA EA ADD 15 MINS: Performed by: STUDENT IN AN ORGANIZED HEALTH CARE EDUCATION/TRAINING PROGRAM

## 2025-02-04 PROCEDURE — 81025 URINE PREGNANCY TEST: CPT | Performed by: STUDENT IN AN ORGANIZED HEALTH CARE EDUCATION/TRAINING PROGRAM

## 2025-02-04 RX ORDER — VASOPRESSIN 20 [USP'U]/ML
INJECTION, SOLUTION INTRAMUSCULAR; SUBCUTANEOUS
Status: DISCONTINUED | OUTPATIENT
Start: 2025-02-04 | End: 2025-02-04 | Stop reason: HOSPADM

## 2025-02-04 RX ORDER — DIPHENHYDRAMINE HCL 25 MG
25 CAPSULE ORAL EVERY 4 HOURS PRN
Status: DISCONTINUED | OUTPATIENT
Start: 2025-02-04 | End: 2025-02-04 | Stop reason: HOSPADM

## 2025-02-04 RX ORDER — OXYCODONE HYDROCHLORIDE 5 MG/1
5 TABLET ORAL
Status: DISCONTINUED | OUTPATIENT
Start: 2025-02-04 | End: 2025-02-04 | Stop reason: HOSPADM

## 2025-02-04 RX ORDER — HYDROCODONE BITARTRATE AND ACETAMINOPHEN 5; 325 MG/1; MG/1
1 TABLET ORAL EVERY 4 HOURS PRN
Status: DISCONTINUED | OUTPATIENT
Start: 2025-02-04 | End: 2025-02-04 | Stop reason: HOSPADM

## 2025-02-04 RX ORDER — LIDOCAINE HYDROCHLORIDE 20 MG/ML
INJECTION INTRAVENOUS
Status: DISCONTINUED | OUTPATIENT
Start: 2025-02-04 | End: 2025-02-04

## 2025-02-04 RX ORDER — IPRATROPIUM BROMIDE AND ALBUTEROL SULFATE 2.5; .5 MG/3ML; MG/3ML
3 SOLUTION RESPIRATORY (INHALATION)
Status: DISCONTINUED | OUTPATIENT
Start: 2025-02-04 | End: 2025-02-04 | Stop reason: HOSPADM

## 2025-02-04 RX ORDER — DIPHENHYDRAMINE HYDROCHLORIDE 50 MG/ML
25 INJECTION INTRAMUSCULAR; INTRAVENOUS EVERY 4 HOURS PRN
Status: DISCONTINUED | OUTPATIENT
Start: 2025-02-04 | End: 2025-02-04 | Stop reason: HOSPADM

## 2025-02-04 RX ORDER — HYDROMORPHONE HYDROCHLORIDE 2 MG/ML
0.5 INJECTION, SOLUTION INTRAMUSCULAR; INTRAVENOUS; SUBCUTANEOUS EVERY 5 MIN PRN
Status: DISCONTINUED | OUTPATIENT
Start: 2025-02-04 | End: 2025-02-04 | Stop reason: HOSPADM

## 2025-02-04 RX ORDER — MIDAZOLAM HYDROCHLORIDE 1 MG/ML
INJECTION INTRAMUSCULAR; INTRAVENOUS
Status: DISCONTINUED | OUTPATIENT
Start: 2025-02-04 | End: 2025-02-04

## 2025-02-04 RX ORDER — SODIUM CHLORIDE, SODIUM LACTATE, POTASSIUM CHLORIDE, CALCIUM CHLORIDE 600; 310; 30; 20 MG/100ML; MG/100ML; MG/100ML; MG/100ML
125 INJECTION, SOLUTION INTRAVENOUS CONTINUOUS
Status: DISCONTINUED | OUTPATIENT
Start: 2025-02-04 | End: 2025-02-04 | Stop reason: HOSPADM

## 2025-02-04 RX ORDER — DIPHENHYDRAMINE HYDROCHLORIDE 50 MG/ML
25 INJECTION INTRAMUSCULAR; INTRAVENOUS EVERY 6 HOURS PRN
Status: DISCONTINUED | OUTPATIENT
Start: 2025-02-04 | End: 2025-02-04 | Stop reason: HOSPADM

## 2025-02-04 RX ORDER — KETOROLAC TROMETHAMINE 30 MG/ML
INJECTION, SOLUTION INTRAMUSCULAR; INTRAVENOUS
Status: DISCONTINUED | OUTPATIENT
Start: 2025-02-04 | End: 2025-02-04

## 2025-02-04 RX ORDER — CEFAZOLIN SODIUM 1 G/3ML
INJECTION, POWDER, FOR SOLUTION INTRAMUSCULAR; INTRAVENOUS
Status: DISCONTINUED | OUTPATIENT
Start: 2025-02-04 | End: 2025-02-04

## 2025-02-04 RX ORDER — SODIUM CHLORIDE 9 MG/ML
INJECTION, SOLUTION INTRAVENOUS CONTINUOUS
Status: DISCONTINUED | OUTPATIENT
Start: 2025-02-04 | End: 2025-02-04 | Stop reason: HOSPADM

## 2025-02-04 RX ORDER — MORPHINE SULFATE 10 MG/ML
4 INJECTION INTRAMUSCULAR; INTRAVENOUS; SUBCUTANEOUS EVERY 5 MIN PRN
Status: DISCONTINUED | OUTPATIENT
Start: 2025-02-04 | End: 2025-02-04 | Stop reason: HOSPADM

## 2025-02-04 RX ORDER — ACETAMINOPHEN 10 MG/ML
1000 INJECTION, SOLUTION INTRAVENOUS ONCE
Status: COMPLETED | OUTPATIENT
Start: 2025-02-04 | End: 2025-02-04

## 2025-02-04 RX ORDER — ONDANSETRON 4 MG/1
8 TABLET, ORALLY DISINTEGRATING ORAL EVERY 8 HOURS PRN
Status: DISCONTINUED | OUTPATIENT
Start: 2025-02-04 | End: 2025-02-04 | Stop reason: HOSPADM

## 2025-02-04 RX ORDER — IBUPROFEN 800 MG/1
800 TABLET ORAL EVERY 6 HOURS PRN
Qty: 90 TABLET | Refills: 0 | Status: SHIPPED | OUTPATIENT
Start: 2025-02-04

## 2025-02-04 RX ORDER — FENTANYL CITRATE 50 UG/ML
INJECTION, SOLUTION INTRAMUSCULAR; INTRAVENOUS
Status: DISCONTINUED | OUTPATIENT
Start: 2025-02-04 | End: 2025-02-04

## 2025-02-04 RX ORDER — PROPOFOL 10 MG/ML
VIAL (ML) INTRAVENOUS
Status: DISCONTINUED | OUTPATIENT
Start: 2025-02-04 | End: 2025-02-04

## 2025-02-04 RX ORDER — ONDANSETRON HYDROCHLORIDE 2 MG/ML
4 INJECTION, SOLUTION INTRAVENOUS DAILY PRN
Status: DISCONTINUED | OUTPATIENT
Start: 2025-02-04 | End: 2025-02-04 | Stop reason: HOSPADM

## 2025-02-04 RX ORDER — HYDROCODONE BITARTRATE AND ACETAMINOPHEN 5; 325 MG/1; MG/1
1 TABLET ORAL EVERY 6 HOURS PRN
Qty: 5 TABLET | Refills: 0 | Status: SHIPPED | OUTPATIENT
Start: 2025-02-04

## 2025-02-04 RX ORDER — ONDANSETRON HYDROCHLORIDE 2 MG/ML
INJECTION, SOLUTION INTRAVENOUS
Status: DISCONTINUED | OUTPATIENT
Start: 2025-02-04 | End: 2025-02-04

## 2025-02-04 RX ADMIN — MORPHINE SULFATE 4 MG: 10 INJECTION INTRAVENOUS at 04:02

## 2025-02-04 RX ADMIN — PROPOFOL 200 MG: 10 INJECTION, EMULSION INTRAVENOUS at 02:02

## 2025-02-04 RX ADMIN — MORPHINE SULFATE 2 MG: 10 INJECTION INTRAVENOUS at 04:02

## 2025-02-04 RX ADMIN — KETOROLAC TROMETHAMINE 30 MG: 30 INJECTION, SOLUTION INTRAMUSCULAR at 03:02

## 2025-02-04 RX ADMIN — FENTANYL CITRATE 50 MCG: 50 INJECTION, SOLUTION INTRAMUSCULAR; INTRAVENOUS at 02:02

## 2025-02-04 RX ADMIN — FENTANYL CITRATE 50 MCG: 50 INJECTION, SOLUTION INTRAMUSCULAR; INTRAVENOUS at 03:02

## 2025-02-04 RX ADMIN — ONDANSETRON 8 MG: 4 TABLET, ORALLY DISINTEGRATING ORAL at 05:02

## 2025-02-04 RX ADMIN — OXYCODONE 5 MG: 5 TABLET ORAL at 05:02

## 2025-02-04 RX ADMIN — MIDAZOLAM HYDROCHLORIDE 2 MG: 1 INJECTION, SOLUTION INTRAMUSCULAR; INTRAVENOUS at 02:02

## 2025-02-04 RX ADMIN — ACETAMINOPHEN 1000 MG: 10 INJECTION, SOLUTION INTRAVENOUS at 04:02

## 2025-02-04 RX ADMIN — PROPOFOL 100 MG: 10 INJECTION, EMULSION INTRAVENOUS at 02:02

## 2025-02-04 RX ADMIN — CEFAZOLIN 2 G: 1 INJECTION, POWDER, FOR SOLUTION INTRAMUSCULAR; INTRAVENOUS; PARENTERAL at 02:02

## 2025-02-04 RX ADMIN — LIDOCAINE HYDROCHLORIDE 50 MG: 20 INJECTION, SOLUTION INTRAVENOUS at 02:02

## 2025-02-04 RX ADMIN — ONDANSETRON 4 MG: 2 INJECTION INTRAMUSCULAR; INTRAVENOUS at 02:02

## 2025-02-04 RX ADMIN — SODIUM CHLORIDE: 9 INJECTION, SOLUTION INTRAVENOUS at 10:02

## 2025-02-04 RX ADMIN — MORPHINE SULFATE 4 MG: 10 INJECTION INTRAVENOUS at 03:02

## 2025-02-04 NOTE — TRANSFER OF CARE
"Anesthesia Transfer of Care Note    Patient: Glenys Modi    Procedure(s) Performed: Procedure(s) (LRB):  LEEP (LOOP ELECTROSURGICAL EXCISION PROCEDURE) (N/A)    Patient location: PACU    Anesthesia Type: general    Transport from OR: Transported from OR on room air with adequate spontaneous ventilation    Post pain: adequate analgesia    Post assessment: no apparent anesthetic complications and tolerated procedure well    Post vital signs: stable    Level of consciousness: responds to stimulation and sedated    Nausea/Vomiting: no nausea/vomiting    Complications: none    Transfer of care protocol was followed      Last vitals: Visit Vitals  /74 (BP Location: Left arm, Patient Position: Lying)   Pulse 64   Temp 36.4 °C (97.6 °F) (Oral)   Resp (!) 9   Ht 5' 4" (1.626 m)   Wt 66.2 kg (146 lb)   LMP  (LMP Unknown)   SpO2 99%   Breastfeeding No   BMI 25.06 kg/m²     "

## 2025-02-04 NOTE — ANESTHESIA POSTPROCEDURE EVALUATION
Anesthesia Post Evaluation    Patient: Glenys Modi    Procedure(s) Performed: Procedure(s) (LRB):  LEEP (LOOP ELECTROSURGICAL EXCISION PROCEDURE) (N/A)    Final Anesthesia Type: general      Patient location during evaluation: PACU  Patient participation: Yes- Able to Participate  Level of consciousness: awake and sedated  Post-procedure vital signs: reviewed and stable  Pain management: adequate  Airway patency: patent    PONV status at discharge: No PONV  Anesthetic complications: no      Cardiovascular status: blood pressure returned to baseline  Respiratory status: unassisted  Hydration status: euvolemic  Follow-up not needed.              Vitals Value Taken Time   /74 02/04/25 1631   Temp 36.4 °C (97.6 °F) 02/04/25 1542   Pulse 64 02/04/25 1643   Resp 11 02/04/25 1621   SpO2 95 % 02/04/25 1643   Vitals shown include unfiled device data.      Event Time   Out of Recovery 16:24:00         Pain/Oliva Score: Pain Rating Prior to Med Admin: 5 (2/4/2025  4:20 PM)  Oliva Score: 9 (2/4/2025  4:20 PM)

## 2025-02-04 NOTE — ANESTHESIA PREPROCEDURE EVALUATION
02/04/2025  Glenys Modi is a 34 y.o., female.      Pre-op Assessment    I have reviewed the Patient Summary Reports.     I have reviewed the Nursing Notes. I have reviewed the NPO Status.   I have reviewed the Medications.     Review of Systems  Anesthesia Hx:  No problems with previous Anesthesia                Social:  Non-Smoker, No Alcohol Use       Hematology/Oncology:    Oncology Normal    -- Anemia:                                  EENT/Dental:  EENT/Dental Normal           Cardiovascular:  Cardiovascular Normal                                              Pulmonary:  Pulmonary Normal                       Renal/:  Renal/ Normal                 Hepatic/GI:  Hepatic/GI Normal                    Musculoskeletal:  Musculoskeletal Normal                Neurological:  Neurology Normal                                      Endocrine:  Endocrine Normal            Dermatological:  Skin Normal    Psych:  Psychiatric Normal                    Physical Exam  General: Well nourished    Airway:  Mallampati: II / II  Mouth Opening: Normal  TM Distance: > 6 cm  Tongue: Normal  Neck ROM: Normal ROM    Chest/Lungs:  Clear to auscultation, Normal Respiratory Rate    Heart:  Rate: Normal  Rhythm: Regular Rhythm        Anesthesia Plan  Type of Anesthesia, risks & benefits discussed:    Anesthesia Type: Gen Supraglottic Airway  Intra-op Monitoring Plan: Standard ASA Monitors  Post Op Pain Control Plan: multimodal analgesia  Induction:  IV  Informed Consent: Informed consent signed with the Patient and all parties understand the risks and agree with anesthesia plan.  All questions answered. Patient consented to blood products? Yes  ASA Score: 2  Day of Surgery Review of History & Physical: H&P Update referred to the surgeon/provider.I have interviewed and examined the patient. I have reviewed the patient's H&P dated:      Ready For Surgery From Anesthesia Perspective.     .

## 2025-02-04 NOTE — OP NOTE
[facility] - Periop Services  Surgery Department  Operative Note    SUMMARY     Date of Procedure: 2/4/2025     Procedure: Procedure(s) (LRB):  LEEP (LOOP ELECTROSURGICAL EXCISION PROCEDURE) (N/A)     Surgeons and Role:     * Jonah Allen DO - Primary    Assisting Surgeon: None    Pre-Operative Diagnosis: EPIFANIO II (cervical intraepithelial neoplasia II) [N87.1]    Post-Operative Diagnosis: Post-Op Diagnosis Codes:     * EPIFANIO II (cervical intraepithelial neoplasia II) [N87.1]    Anesthesia: General    Operative Findings (including complications, if any): no decreased uptake of Lugol's solution    Description of Technical Procedures:     The patient was taken to the OR with IVF running and pneumatic compression devices in place and turned on.  General anesthesia was administered without difficulty.  The patient was placed in the dorsal lithotomy position with Olvin-type stirrups. The patient was then prepped and draped in the normal sterile fashion.  A time out was then performed with the attending physician present.    A coated speculum was placed into the vagina and the cervix was visualized. A coated tenaculum was placed on the anterior lip of the cervix.  Local anesthetic was injected in a circumferential fashion around the ectocervix.  Lugol's solution were applied to the cervix and the aforementioned findings were noted. Stay sutures were placed at 3 and 9 o'clock with 2-0 chromic. Using a loop electrocautery device, the ectocervix was removed in a controlled fashion and the specimen was sent to pathology.  Hemostasis was achieved with a roller ball as well as monsel's solution.  All instruments were removed from the vagina.     The patient tolerated the procedure well. The instrument and sponge counts were correct times two. The patient was awakened from anesthesia and taken to the recovery room in stable condition.  The patient will go home after recovering from anesthesia and meeting all  criteria for discharge.     Significant Surgical Tasks Conducted by the Assistant(s), if Applicable: NA    Estimated Blood Loss (EBL): * No values recorded between 2/4/2025  3:01 PM and 2/4/2025  3:27 PM *           Implants: * No implants in log *    Specimens:   Specimen (24h ago, onward)       Start     Ordered    02/04/25 1514  Surgical Pathology  RELEASE UPON ORDERING         02/04/25 1514                            Condition: Good    Disposition: PACU - hemodynamically stable.

## 2025-02-04 NOTE — OR NURSING
1535 Rec'd pt to PACU asleep with oral airway in place. No signs of distress noted, VSS. Era pad C/D/I. No needs. Will continue to monitor.     1552 Oral airway removed, respirations even and unlabored. Reoriented to surroundings. No needs.     1557 Pt c/o pain 8/10. IV morphine given, see MAR for admin.     1605 Ofirmiv given for pain, see MAR for admin.     1624 Out of PACU. VSS. No signs of bleeding/distress noted.     1630 Pt to ASC 17 drowsy but arousable to verbal stimuli. No signs of distress noted, respirations even and unlabored. Family at bedside. Bedside report given to DARVIN Madison RN. Era pad C/D/I. States pain improving, denies other needs. /79, P 61, R 20, O2 100% RA.

## 2025-02-07 LAB
DHEA SERPL-MCNC: NORMAL
ESTROGEN SERPL-MCNC: NORMAL PG/ML
INSULIN SERPL-ACNC: NORMAL U[IU]/ML
LAB AP GROSS DESCRIPTION: NORMAL
LAB AP LABORATORY NOTES: NORMAL
T3RU NFR SERPL: NORMAL %

## 2025-02-12 ENCOUNTER — PATIENT MESSAGE (OUTPATIENT)
Dept: OBSTETRICS AND GYNECOLOGY | Facility: CLINIC | Age: 35
End: 2025-02-12
Payer: COMMERCIAL

## 2025-02-17 ENCOUNTER — OFFICE VISIT (OUTPATIENT)
Dept: OBSTETRICS AND GYNECOLOGY | Facility: CLINIC | Age: 35
End: 2025-02-17
Payer: COMMERCIAL

## 2025-02-17 VITALS
WEIGHT: 146 LBS | HEART RATE: 89 BPM | SYSTOLIC BLOOD PRESSURE: 115 MMHG | DIASTOLIC BLOOD PRESSURE: 69 MMHG | BODY MASS INDEX: 25.06 KG/M2

## 2025-02-17 DIAGNOSIS — N87.1 CIN II (CERVICAL INTRAEPITHELIAL NEOPLASIA II): Primary | ICD-10-CM

## 2025-02-17 PROCEDURE — 99024 POSTOP FOLLOW-UP VISIT: CPT | Mod: ,,, | Performed by: STUDENT IN AN ORGANIZED HEALTH CARE EDUCATION/TRAINING PROGRAM

## 2025-02-17 PROCEDURE — 3074F SYST BP LT 130 MM HG: CPT | Mod: CPTII,,, | Performed by: STUDENT IN AN ORGANIZED HEALTH CARE EDUCATION/TRAINING PROGRAM

## 2025-02-17 PROCEDURE — 3078F DIAST BP <80 MM HG: CPT | Mod: CPTII,,, | Performed by: STUDENT IN AN ORGANIZED HEALTH CARE EDUCATION/TRAINING PROGRAM

## 2025-02-17 PROCEDURE — 1159F MED LIST DOCD IN RCRD: CPT | Mod: CPTII,,, | Performed by: STUDENT IN AN ORGANIZED HEALTH CARE EDUCATION/TRAINING PROGRAM

## 2025-02-17 PROCEDURE — 99999 PR PBB SHADOW E&M-EST. PATIENT-LVL III: CPT | Mod: PBBFAC,,, | Performed by: STUDENT IN AN ORGANIZED HEALTH CARE EDUCATION/TRAINING PROGRAM

## 2025-02-17 PROCEDURE — 99213 OFFICE O/P EST LOW 20 MIN: CPT | Mod: PBBFAC | Performed by: STUDENT IN AN ORGANIZED HEALTH CARE EDUCATION/TRAINING PROGRAM

## 2025-02-27 PROBLEM — N87.1 CIN II (CERVICAL INTRAEPITHELIAL NEOPLASIA II): Status: ACTIVE | Noted: 2025-02-27

## 2025-02-28 NOTE — PROGRESS NOTES
Subjective:       Glenys Modi is a 34 y.o. female who presents to the clinic 2 weeks status post LEEP for EPIFANIO 2. Eating a regular diet without difficulty. Bowel movements are normal. The patient is not having any pain. Reports cramps and discharge.    The following portions of the patient's history were reviewed and updated as appropriate: allergies, current medications, past family history, past medical history, past social history, past surgical history, and problem list.    Review of Systems  Pertinent items are noted in HPI.      Objective:      /69 (BP Location: Right arm, Patient Position: Sitting)   Pulse 89   Wt 66.2 kg (146 lb)   LMP  (LMP Unknown) Comment: negative preg test 2/4/2025  BMI 25.06 kg/m²   General:  alert, appears stated age, and cooperative   Abdomen: soft, non-tender       Assessment:      Doing well postoperatively.  Operative findings again reviewed. Pathology report discussed.      Plan:      1. Continue any current medications.  2. Wound care discussed.  3. Activity restrictions: none  4. Anticipated return to work: now.  5. Follow up: 2 weeks.

## (undated) DEVICE — ELECTRODE LEEP BALL 5MM(12CM)

## (undated) DEVICE — GLOVE SENSICARE PI GRN 7.5

## (undated) DEVICE — Device

## (undated) DEVICE — SUT CHROMIC 2-0 CT1 36IN BR

## (undated) DEVICE — NDL SPINAL 22GA 3.5 IN QUINCKE

## (undated) DEVICE — SYR 10CC LUER LOCK

## (undated) DEVICE — TUBING SMOKE EVACUATOR LEEP

## (undated) DEVICE — GLOVE SENSICARE PI SURG 6.5

## (undated) DEVICE — KIT LITHOTOMY RUSH

## (undated) DEVICE — GLOVE SENSICARE PI GRN 6.5